# Patient Record
Sex: FEMALE | Race: BLACK OR AFRICAN AMERICAN | NOT HISPANIC OR LATINO | Employment: OTHER | URBAN - METROPOLITAN AREA
[De-identification: names, ages, dates, MRNs, and addresses within clinical notes are randomized per-mention and may not be internally consistent; named-entity substitution may affect disease eponyms.]

---

## 2017-01-10 RX ORDER — ALPRAZOLAM 0.25 MG/1
TABLET ORAL
Qty: 120 TABLET | Refills: 1 | Status: SHIPPED | OUTPATIENT
Start: 2017-01-10 | End: 2017-04-12 | Stop reason: SDUPTHER

## 2017-01-10 NOTE — TELEPHONE ENCOUNTER
I am refilling a requestedbenzodiazipine medication alprazolam for the patient. I would like to see her for DM fu and to discuss this medication-she has a1c scheduled jan 20 so after that 1-2 weeks  Please book the patient with me

## 2017-01-12 RX ORDER — CYCLOBENZAPRINE HCL 10 MG
10 TABLET ORAL 3 TIMES DAILY PRN
Qty: 90 TABLET | Refills: 5 | Status: SHIPPED | OUTPATIENT
Start: 2017-01-12 | End: 2017-10-17

## 2017-01-13 RX ORDER — BENAZEPRIL HYDROCHLORIDE AND HYDROCHLOROTHIAZIDE 10; 12.5 MG/1; MG/1
1 TABLET ORAL DAILY
Qty: 90 TABLET | Refills: 3 | Status: SHIPPED | OUTPATIENT
Start: 2017-01-13 | End: 2018-03-15 | Stop reason: SDUPTHER

## 2017-01-26 ENCOUNTER — LAB VISIT (OUTPATIENT)
Dept: LAB | Facility: HOSPITAL | Age: 72
End: 2017-01-26
Attending: FAMILY MEDICINE
Payer: COMMERCIAL

## 2017-01-26 DIAGNOSIS — E11.9 TYPE 2 DIABETES MELLITUS WITHOUT COMPLICATION: ICD-10-CM

## 2017-01-26 LAB
ALBUMIN SERPL BCP-MCNC: 3.2 G/DL
ALP SERPL-CCNC: 74 U/L
ALT SERPL W/O P-5'-P-CCNC: 9 U/L
ANION GAP SERPL CALC-SCNC: 6 MMOL/L
AST SERPL-CCNC: 16 U/L
BASOPHILS # BLD AUTO: 0.02 K/UL
BASOPHILS NFR BLD: 0.3 %
BILIRUB SERPL-MCNC: 0.6 MG/DL
BUN SERPL-MCNC: 19 MG/DL
CALCIUM SERPL-MCNC: 9.2 MG/DL
CHLORIDE SERPL-SCNC: 106 MMOL/L
CHOLEST/HDLC SERPL: 3.7 {RATIO}
CO2 SERPL-SCNC: 28 MMOL/L
CREAT SERPL-MCNC: 0.9 MG/DL
DIFFERENTIAL METHOD: ABNORMAL
EOSINOPHIL # BLD AUTO: 0.3 K/UL
EOSINOPHIL NFR BLD: 3.8 %
ERYTHROCYTE [DISTWIDTH] IN BLOOD BY AUTOMATED COUNT: 15.5 %
EST. GFR  (AFRICAN AMERICAN): >60 ML/MIN/1.73 M^2
EST. GFR  (NON AFRICAN AMERICAN): >60 ML/MIN/1.73 M^2
GLUCOSE SERPL-MCNC: 104 MG/DL
HCT VFR BLD AUTO: 36.2 %
HDL/CHOLESTEROL RATIO: 27.4 %
HDLC SERPL-MCNC: 179 MG/DL
HDLC SERPL-MCNC: 49 MG/DL
HGB BLD-MCNC: 11.4 G/DL
LDLC SERPL CALC-MCNC: 119.4 MG/DL
LYMPHOCYTES # BLD AUTO: 3.3 K/UL
LYMPHOCYTES NFR BLD: 42 %
MCH RBC QN AUTO: 26.5 PG
MCHC RBC AUTO-ENTMCNC: 31.5 %
MCV RBC AUTO: 84 FL
MONOCYTES # BLD AUTO: 0.7 K/UL
MONOCYTES NFR BLD: 8.2 %
NEUTROPHILS # BLD AUTO: 3.6 K/UL
NEUTROPHILS NFR BLD: 45.7 %
NONHDLC SERPL-MCNC: 130 MG/DL
PLATELET # BLD AUTO: 326 K/UL
PMV BLD AUTO: 9 FL
POTASSIUM SERPL-SCNC: 4.3 MMOL/L
PROT SERPL-MCNC: 6.7 G/DL
RBC # BLD AUTO: 4.3 M/UL
SODIUM SERPL-SCNC: 140 MMOL/L
TRIGL SERPL-MCNC: 53 MG/DL
TSH SERPL DL<=0.005 MIU/L-ACNC: 0.91 UIU/ML
WBC # BLD AUTO: 7.9 K/UL

## 2017-01-26 PROCEDURE — 36415 COLL VENOUS BLD VENIPUNCTURE: CPT | Mod: PO

## 2017-01-26 PROCEDURE — 84443 ASSAY THYROID STIM HORMONE: CPT

## 2017-01-26 PROCEDURE — 83036 HEMOGLOBIN GLYCOSYLATED A1C: CPT

## 2017-01-26 PROCEDURE — 85025 COMPLETE CBC W/AUTO DIFF WBC: CPT

## 2017-01-26 PROCEDURE — 80053 COMPREHEN METABOLIC PANEL: CPT

## 2017-01-26 PROCEDURE — 80061 LIPID PANEL: CPT

## 2017-01-27 LAB
ESTIMATED AVG GLUCOSE: 148 MG/DL
HBA1C MFR BLD HPLC: 6.8 %

## 2017-02-09 ENCOUNTER — TELEPHONE (OUTPATIENT)
Dept: FAMILY MEDICINE | Facility: CLINIC | Age: 72
End: 2017-02-09

## 2017-02-09 NOTE — TELEPHONE ENCOUNTER
----- Message from Christin Wong sent at 2/9/2017  1:10 PM CST -----  Contact: pt  Pt is requesting to speak with nurse regarding vaginal itching. Pls call pt back at [phone]

## 2017-02-14 ENCOUNTER — OFFICE VISIT (OUTPATIENT)
Dept: FAMILY MEDICINE | Facility: CLINIC | Age: 72
End: 2017-02-14
Payer: MEDICARE

## 2017-02-14 VITALS
WEIGHT: 187.38 LBS | HEIGHT: 65 IN | DIASTOLIC BLOOD PRESSURE: 83 MMHG | HEART RATE: 68 BPM | SYSTOLIC BLOOD PRESSURE: 152 MMHG | BODY MASS INDEX: 31.22 KG/M2 | TEMPERATURE: 98 F

## 2017-02-14 DIAGNOSIS — N89.8 VAGINAL ITCHING: Primary | ICD-10-CM

## 2017-02-14 DIAGNOSIS — N89.8 VAGINAL DISCHARGE: ICD-10-CM

## 2017-02-14 LAB
BACTERIA GENITAL QL WET PREP: ABNORMAL
CLUE CELLS VAG QL WET PREP: ABNORMAL
FILAMENT FUNGI VAG WET PREP-#/AREA: ABNORMAL
SPECIMEN SOURCE: ABNORMAL
T VAGINALIS GENITAL QL WET PREP: ABNORMAL
WBC #/AREA VAG WET PREP: ABNORMAL
YEAST GENITAL QL WET PREP: ABNORMAL

## 2017-02-14 PROCEDURE — 99213 OFFICE O/P EST LOW 20 MIN: CPT | Mod: PBBFAC,PO | Performed by: NURSE PRACTITIONER

## 2017-02-14 PROCEDURE — 99999 PR PBB SHADOW E&M-EST. PATIENT-LVL III: CPT | Mod: PBBFAC,,, | Performed by: NURSE PRACTITIONER

## 2017-02-14 PROCEDURE — 99213 OFFICE O/P EST LOW 20 MIN: CPT | Mod: S$PBB,,, | Performed by: NURSE PRACTITIONER

## 2017-02-14 PROCEDURE — 87210 SMEAR WET MOUNT SALINE/INK: CPT | Mod: PO

## 2017-02-14 RX ORDER — NYSTATIN 100000 U/G
CREAM TOPICAL 2 TIMES DAILY
Qty: 1 TUBE | Refills: 0 | Status: SHIPPED | OUTPATIENT
Start: 2017-02-14 | End: 2017-06-13

## 2017-02-14 RX ORDER — FLUCONAZOLE 150 MG/1
150 TABLET ORAL DAILY
Qty: 1 TABLET | Refills: 0 | Status: SHIPPED | OUTPATIENT
Start: 2017-02-14 | End: 2017-02-15

## 2017-02-14 NOTE — PROGRESS NOTES
Subjective:       Patient ID: Cheryl Quiroga is a 71 y.o. female.    Chief Complaint: Vaginal Itching    Vaginal Itching   The patient's primary symptoms include genital itching and vaginal discharge (and itching). The patient's pertinent negatives include no genital lesions, genital odor, genital rash, missed menses, pelvic pain or vaginal bleeding. This is a new problem. The current episode started 1 to 4 weeks ago. The problem occurs daily. The problem has been unchanged. The patient is experiencing no pain. The problem affects both sides. She is not pregnant. Pertinent negatives include no abdominal pain, anorexia, back pain, chills, constipation, diarrhea, discolored urine, dysuria, fever, flank pain, frequency, headaches, hematuria, joint pain, joint swelling, nausea, painful intercourse, rash, sore throat, urgency or vomiting. The vaginal discharge was thin and white. There has been no bleeding. She has not been passing clots. She has not been passing tissue. Nothing aggravates the symptoms. She has tried nothing for the symptoms. No, her partner does not have an STD. She uses nothing for contraception. She is postmenopausal. There is no history of an abdominal surgery, a  section, an ectopic pregnancy, endometriosis, a gynecological surgery, herpes simplex, menorrhagia, metrorrhagia, miscarriage, ovarian cysts, perineal abscess, PID, an STD, a terminated pregnancy or vaginosis.     Past Medical History   Diagnosis Date    Anemia     Anxiety     Asthma     Cataract     Colon polyps     Diverticulosis     Diverticulosis of large intestine without hemorrhage 2015    DM (diabetes mellitus) 2012    Encounter for blood transfusion     External hemorrhoids     GERD (gastroesophageal reflux disease) 2015    Hemorrhoids     Hypertension     Internal hemorrhoids     Left knee DJD 2015    Migraines      Social History     Social History    Marital status:       Spouse name: N/A    Number of children: N/A    Years of education: N/A     Occupational History         Social History Main Topics    Smoking status: Never Smoker    Smokeless tobacco: Never Used    Alcohol use No    Drug use: No    Sexual activity: Yes     Partners: Male     Past Surgical History   Procedure Laterality Date    Hysterectomy      Colonoscopy  2010    Joint replacement       right knee       Review of Systems   Constitutional: Negative.  Negative for chills and fever.   HENT: Negative.  Negative for sore throat.    Eyes: Negative.    Respiratory: Negative.    Cardiovascular: Negative.    Gastrointestinal: Negative.  Negative for abdominal pain, anorexia, constipation, diarrhea, nausea and vomiting.   Endocrine: Negative.    Genitourinary: Positive for vaginal discharge (and itching). Negative for dysuria, flank pain, frequency, hematuria, menorrhagia, missed menses, pelvic pain and urgency.   Musculoskeletal: Negative.  Negative for back pain and joint pain.   Skin: Negative.  Negative for rash.   Allergic/Immunologic: Negative.    Neurological: Negative.  Negative for headaches.   Psychiatric/Behavioral: Negative.        Objective:      Physical Exam   Constitutional: She is oriented to person, place, and time. She appears well-developed and well-nourished.   HENT:   Head: Normocephalic.   Right Ear: External ear normal.   Left Ear: External ear normal.   Nose: Nose normal.   Mouth/Throat: Oropharynx is clear and moist.   Eyes: Conjunctivae are normal. Pupils are equal, round, and reactive to light.   Neck: Normal range of motion. Neck supple.   Cardiovascular: Normal rate, regular rhythm and normal heart sounds.    Pulmonary/Chest: Effort normal and breath sounds normal.   Abdominal: Soft. Bowel sounds are normal.   Genitourinary: Pelvic exam was performed with patient supine. No labial fusion. There is no rash, tenderness, lesion or injury on the right labia. There is no rash, tenderness,  lesion or injury on the left labia. No erythema, tenderness or bleeding in the vagina. No foreign body in the vagina. No signs of injury around the vagina. Vaginal discharge (Small amount thin white) found.   Musculoskeletal: Normal range of motion.   Neurological: She is alert and oriented to person, place, and time.   Skin: Skin is warm and dry.   Psychiatric: She has a normal mood and affect. Her behavior is normal. Judgment and thought content normal.   Nursing note and vitals reviewed.      Assessment:       1. Vaginal itching    2. Vaginal discharge        Plan:           Cheryl was seen today for vaginal itching.    Diagnoses and all orders for this visit:    Vaginal itching  Vaginal discharge  -     Vaginal Screen Vagina; Future  -     nystatin (MYCOSTATIN) cream; Apply topically 2 (two) times daily.  -     fluconazole (DIFLUCAN) 150 MG Tab; Take 1 tablet (150 mg total) by mouth once daily.

## 2017-02-14 NOTE — MR AVS SNAPSHOT
[facility]  [address] [address]  Phone: [phone]  Fax: [phone]                  Cheryl Quiroga   2017 9:40 AM   Office Visit    Description:  Female : 1945   Provider:  Skylar Silveira NP   Department:  [facility]           Reason for Visit     Vaginal Itching           Diagnoses this Visit        Comments    Vaginal itching    -  Primary     Vaginal discharge                To Do List           Goals (5 Years of Data)     None      [facility]     [facility] Care [facility] -  Assistance  Registered nurses in the [facility] On Call Center provide clinical advisement, health education, appointment booking, and other advisory services.  Call for this free service at [phone].             Medications           Message regarding Medications     Verify the changes and/or additions to your medication regime listed below are the same as discussed with your clinician today.  If any of these changes or additions are incorrect, please notify your healthcare provider.             Verify that the below list of medications is an accurate representation of the medications you are currently taking.  If none reported, the list may be blank. If incorrect, please contact your healthcare provider. Carry this list with you in case of emergency.           Current Medications     alprazolam (XANAX) 0.25 MG tablet TAKE ONE TABLET FOUR TIMES DAILY    benazepril-hydrochlorthiazide (LOTENSIN HCT) 10-12.5 mg Tab Take 1 tablet by mouth once daily.    cephALEXin (KEFLEX) 500 MG capsule Take 1 capsule (500 mg total) by mouth every 12 (twelve) hours.    cyclobenzaprine (FLEXERIL) 10 MG tablet Take 1 tablet (10 mg total) by mouth 3 (three) times daily as needed.    donepezil (ARICEPT) 10 MG tablet Take 1 tablet (10 mg total) by mouth every evening.    fluticasone-salmeterol (ADVAIR DISKUS) 250-50 mcg/dose diskus inhaler Twice a day     "meloxicam (MOBIC) 15 MG tablet Take 1 tablet (15 mg total) by mouth once daily.    metformin (GLUCOPHAGE) 500 MG tablet TAKE TWO TABLETS BY MOUTH EVERY MORNING and 3t EVERY EVENING    MULTIVITAMIN ORAL Every day    tramadol (ULTRAM) 50 mg tablet TAKE ONE TABLET BY MOUTH THREE TIMES DAILY AS NEEDED    omeprazole (PRILOSEC) 40 MG capsule Take 1 capsule (40 mg total) by mouth once daily.           Clinical Reference Information           Your Vitals Were     BP Pulse Temp Height Weight BMI    152/83 68 97.8 °F (36.6 °C) (Oral) 5' 5" (1.651 m) 85 kg (187 lb 6.3 oz) 31.18 kg/m2      Blood Pressure          Most Recent Value    BP  (!)  152/83      Allergies as of 2/14/2017     Codeine    Iodine      Immunizations Administered on Date of Encounter - 2/14/2017     None      Orders Placed During Today's Visit     Future Labs/Procedures Expected by Expires    Vaginal Screen Vagina  2/14/2017 4/15/2018      Language Assistance Services     ATTENTION: Language assistance services are available, free of charge. Please call [phone].      ATENCIÓN: Si habla español, tiene a sheldon disposición servicios gratuitos de asistencia lingüística. Llame al [phone].     JOSEPH Ý: N?u b?n nói Ti?ng Vi?t, có các d?ch v? h? tr? ngôn ng? mi?n phí dành cho b?n. G?i s? [phone].         [facility] [place], operated by [facility] complies with applicable Federal civil rights laws and does not discriminate on the basis of race, color, national origin, age, disability, or sex.        "

## 2017-02-21 ENCOUNTER — OFFICE VISIT (OUTPATIENT)
Dept: FAMILY MEDICINE | Facility: CLINIC | Age: 72
End: 2017-02-21
Payer: MEDICARE

## 2017-02-21 VITALS
HEART RATE: 79 BPM | OXYGEN SATURATION: 98 % | DIASTOLIC BLOOD PRESSURE: 81 MMHG | SYSTOLIC BLOOD PRESSURE: 158 MMHG | HEIGHT: 65 IN | BODY MASS INDEX: 30.66 KG/M2 | WEIGHT: 184 LBS

## 2017-02-21 DIAGNOSIS — J06.9 VIRAL URI WITH COUGH: Primary | ICD-10-CM

## 2017-02-21 PROCEDURE — 99999 PR PBB SHADOW E&M-EST. PATIENT-LVL III: CPT | Mod: PBBFAC,,,

## 2017-02-21 PROCEDURE — 99213 OFFICE O/P EST LOW 20 MIN: CPT | Mod: PBBFAC,PO

## 2017-02-21 PROCEDURE — 99213 OFFICE O/P EST LOW 20 MIN: CPT | Mod: S$PBB,,,

## 2017-02-21 RX ORDER — PROMETHAZINE HYDROCHLORIDE AND DEXTROMETHORPHAN HYDROBROMIDE 6.25; 15 MG/5ML; MG/5ML
5 SYRUP ORAL 3 TIMES DAILY PRN
Qty: 180 ML | Refills: 0 | Status: SHIPPED | OUTPATIENT
Start: 2017-02-21 | End: 2017-03-03

## 2017-02-21 RX ORDER — METHYLPREDNISOLONE 4 MG/1
TABLET ORAL
Qty: 1 PACKAGE | Refills: 0 | Status: SHIPPED | OUTPATIENT
Start: 2017-02-21 | End: 2017-02-27

## 2017-02-21 NOTE — PROGRESS NOTES
Subjective:       Patient ID: Cheryl Quiroga is a 71 y.o. female.    Chief Complaint: No chief complaint on file.    HPI   The patient presents today with a 4 day(s) complaint of nasal congestion, PND, rhinorrhea. she says Her symptoms are constant and moderate in intensity.  she voices associated cough and  Sore throat. she denies fever, SOB, wheezing. she can not identify and exacerbating or mitigating factors.      Review of Systems   Constitutional: Negative for activity change, appetite change, fatigue, fever and unexpected weight change.   HENT: Positive for congestion, postnasal drip, rhinorrhea and sore throat.    Eyes: Negative.    Respiratory: Positive for cough. Negative for chest tightness, shortness of breath and wheezing.    Cardiovascular: Negative for chest pain, palpitations and leg swelling.   Gastrointestinal: Negative for constipation, diarrhea, nausea and vomiting.   Endocrine: Negative.    Genitourinary: Negative.    Musculoskeletal: Negative.    Skin: Negative for color change.   Allergic/Immunologic: Negative.    Neurological: Positive for headaches. Negative for dizziness, weakness and light-headedness.   Hematological: Negative.    Psychiatric/Behavioral: Negative for sleep disturbance.         Objective:      Physical Exam   Constitutional: She is oriented to person, place, and time. She appears well-developed and well-nourished. No distress.   HENT:   Head: Normocephalic and atraumatic. Hair is normal.   Right Ear: Hearing, tympanic membrane, external ear and ear canal normal.   Left Ear: Hearing, tympanic membrane, external ear and ear canal normal.   Nose: Mucosal edema and rhinorrhea present. No nose lacerations, sinus tenderness, nasal deformity, septal deviation or nasal septal hematoma. No epistaxis.  No foreign bodies. Right sinus exhibits no maxillary sinus tenderness and no frontal sinus tenderness. Left sinus exhibits no maxillary sinus tenderness and no frontal sinus  tenderness.   Mouth/Throat: Uvula is midline and mucous membranes are normal.   Eyes: Conjunctivae are normal. Pupils are equal, round, and reactive to light. Right eye exhibits no discharge. Left eye exhibits no discharge.   Neck: Trachea normal, normal range of motion and phonation normal. Neck supple. No tracheal deviation present.   Cardiovascular: Normal rate, regular rhythm, normal heart sounds and intact distal pulses.  Exam reveals no gallop and no friction rub.    No murmur heard.  Pulmonary/Chest: Effort normal and breath sounds normal. No respiratory distress. She has no decreased breath sounds. She has no wheezes. She has no rhonchi. She has no rales. She exhibits no tenderness.   Musculoskeletal: Normal range of motion.   Lymphadenopathy:        Head (right side): No submental, no submandibular, no tonsillar, no preauricular, no posterior auricular and no occipital adenopathy present.        Head (left side): No submental, no submandibular, no tonsillar, no preauricular, no posterior auricular and no occipital adenopathy present.     She has no cervical adenopathy.        Right cervical: No superficial cervical, no deep cervical and no posterior cervical adenopathy present.       Left cervical: No superficial cervical, no deep cervical and no posterior cervical adenopathy present.   Neurological: She is alert and oriented to person, place, and time. She exhibits normal muscle tone. GCS eye subscore is 4. GCS verbal subscore is 5. GCS motor subscore is 6.   Skin: Skin is warm and dry. No rash noted. She is not diaphoretic. No erythema. No pallor.   Psychiatric: She has a normal mood and affect. Her speech is normal and behavior is normal. Judgment and thought content normal.       Assessment:       1. Viral URI with cough          Plan:   Viral URI with cough  -     methylPREDNISolone (MEDROL DOSEPACK) 4 mg tablet; use as directed  Dispense: 1 Package; Refill: 0  -     promethazine-dextromethorphan  (PROMETHAZINE-DM) 6.25-15 mg/5 mL Syrp; Take 5 mLs by mouth 3 (three) times daily as needed.  Dispense: 180 mL; Refill: 0            Disclaimer: This note is prepared using voice recognition software.  As such there may be errors in the dictation.  It has not been proofread.

## 2017-02-21 NOTE — LETTER
February 21, 2017      [facility]  [address] [address]  Phone: [phone]  Fax: [phone]       Patient: Cheryl Quiorga   YOB: 1945  Date of Visit: 02/21/2017    To Whom It May Concern:    Cheryl was at [facility] on 02/21/2017. She may return to work/school on 02/24/2017 with no restrictions. If you have any questions or concerns, or if I can be of further assistance, please do not hesitate to contact me.    Sincerely,    SHIVANI Cox

## 2017-03-22 ENCOUNTER — OFFICE VISIT (OUTPATIENT)
Dept: ORTHOPEDICS | Facility: CLINIC | Age: 72
End: 2017-03-22
Payer: MEDICARE

## 2017-03-22 VITALS
HEART RATE: 84 BPM | BODY MASS INDEX: 30.46 KG/M2 | HEIGHT: 65 IN | DIASTOLIC BLOOD PRESSURE: 72 MMHG | SYSTOLIC BLOOD PRESSURE: 140 MMHG | WEIGHT: 182.81 LBS

## 2017-03-22 DIAGNOSIS — M54.32 SCIATICA, LEFT SIDE: Primary | ICD-10-CM

## 2017-03-22 DIAGNOSIS — M21.162 GENU VARUM OF LEFT LOWER EXTREMITY: ICD-10-CM

## 2017-03-22 DIAGNOSIS — G89.29 CHRONIC PAIN OF LEFT KNEE: ICD-10-CM

## 2017-03-22 DIAGNOSIS — M17.12 OSTEOARTHRITIS OF LEFT KNEE, UNSPECIFIED OSTEOARTHRITIS TYPE: ICD-10-CM

## 2017-03-22 DIAGNOSIS — M25.562 CHRONIC PAIN OF LEFT KNEE: ICD-10-CM

## 2017-03-22 PROCEDURE — 99214 OFFICE O/P EST MOD 30 MIN: CPT | Mod: S$PBB,,, | Performed by: ORTHOPAEDIC SURGERY

## 2017-03-22 PROCEDURE — 99999 PR PBB SHADOW E&M-EST. PATIENT-LVL III: CPT | Mod: PBBFAC,,, | Performed by: ORTHOPAEDIC SURGERY

## 2017-03-22 PROCEDURE — 99213 OFFICE O/P EST LOW 20 MIN: CPT | Mod: PBBFAC,PO | Performed by: ORTHOPAEDIC SURGERY

## 2017-03-22 RX ORDER — GABAPENTIN 300 MG/1
300 CAPSULE ORAL 2 TIMES DAILY
Qty: 60 CAPSULE | Refills: 0 | Status: SHIPPED | OUTPATIENT
Start: 2017-03-22 | End: 2017-10-17

## 2017-03-22 RX ORDER — METHYLPREDNISOLONE 4 MG/1
TABLET ORAL
Qty: 1 PACKAGE | Refills: 0 | Status: SHIPPED | OUTPATIENT
Start: 2017-03-22 | End: 2017-06-13

## 2017-03-22 RX ORDER — MELOXICAM 15 MG/1
15 TABLET ORAL DAILY
Qty: 30 TABLET | Refills: 3 | Status: SHIPPED | OUTPATIENT
Start: 2017-03-22 | End: 2017-08-10

## 2017-03-22 NOTE — MR AVS SNAPSHOT
[facility]  [address] [address]  Phone: [phone]                  Cheryl Quiroga   3/22/2017 9:15 AM   Office Visit    Description:  Female : 1945   Provider:  Jaden Quiroga MD   Department:  [facility]           Reason for Visit     Left Knee - Pain           Diagnoses this Visit        Comments    Sciatica, left side    -  Primary     Osteoarthritis of left knee, unspecified osteoarthritis type         Chronic pain of left knee         Genu varum of left lower extremity                To Do List           Future Appointments        Provider Department Dept Phone    2017 10:15 AM Jaden Quiroga MD [facility] [phone]      Goals (5 Years of Data)     None      Follow-Up and Disposition     Return in about 2 weeks (around 2017).       These Medications        Disp Refills Start End    methylPREDNISolone (MEDROL DOSEPACK) 4 mg tablet 1 Package 0 3/22/2017     use as directed    [facility] Ph #: [phone]       meloxicam (MOBIC) 15 MG tablet 30 tablet 3 3/22/2017     Take 1 tablet (15 mg total) by mouth once daily. - Oral    [facility] Ph #: [phone]       gabapentin (NEURONTIN) 300 MG capsule 60 capsule 0 3/22/2017 3/22/2018    Take 1 capsule (300 mg total) by mouth 2 (two) times daily. - Oral    [facility] Ph #: [phone]         [facility]     [facility] On Call Nurse Care Line -  Assistance  Registered nurses in the [facility] On Call Center provide clinical advisement, health education, appointment booking, and other advisory services.  Call for this free service at [phone].             Medications           Message regarding Medications     Verify the changes and/or additions to your medication regime listed below  are the same as discussed with your clinician today.  If any of these changes or additions are incorrect, please notify your healthcare provider.        START taking these NEW medications        Refills    methylPREDNISolone (MEDROL DOSEPACK) 4 mg tablet 0    Sig: use as directed    Class: Normal    gabapentin (NEURONTIN) 300 MG capsule 0    Sig: Take 1 capsule (300 mg total) by mouth 2 (two) times daily.    Class: Normal    Route: Oral           Verify that the below list of medications is an accurate representation of the medications you are currently taking.  If none reported, the list may be blank. If incorrect, please contact your healthcare provider. Carry this list with you in case of emergency.           Current Medications     alprazolam (XANAX) 0.25 MG tablet TAKE ONE TABLET FOUR TIMES DAILY    benazepril-hydrochlorthiazide (LOTENSIN HCT) 10-12.5 mg Tab Take 1 tablet by mouth once daily.    cephALEXin (KEFLEX) 500 MG capsule Take 1 capsule (500 mg total) by mouth every 12 (twelve) hours.    cyclobenzaprine (FLEXERIL) 10 MG tablet Take 1 tablet (10 mg total) by mouth 3 (three) times daily as needed.    donepezil (ARICEPT) 10 MG tablet Take 1 tablet (10 mg total) by mouth every evening.    fluticasone-salmeterol (ADVAIR DISKUS) 250-50 mcg/dose diskus inhaler Twice a day    meloxicam (MOBIC) 15 MG tablet Take 1 tablet (15 mg total) by mouth once daily.    metformin (GLUCOPHAGE) 500 MG tablet TAKE TWO TABLETS BY MOUTH EVERY MORNING and 3t EVERY EVENING    MULTIVITAMIN ORAL Every day    tramadol (ULTRAM) 50 mg tablet TAKE ONE TABLET BY MOUTH THREE TIMES DAILY AS NEEDED    gabapentin (NEURONTIN) 300 MG capsule Take 1 capsule (300 mg total) by mouth 2 (two) times daily.    methylPREDNISolone (MEDROL DOSEPACK) 4 mg tablet use as directed    nystatin (MYCOSTATIN) cream Apply topically 2 (two) times daily.    omeprazole (PRILOSEC) 40 MG capsule Take 1 capsule (40 mg total) by mouth once daily.           Clinical  "Reference Information           Your Vitals Were     BP Pulse Height Weight BMI    140/72 84 5' 5" (1.651 m) 82.9 kg (182 lb 12.8 oz) 30.42 kg/m2      Blood Pressure          Most Recent Value    BP  (!)  140/72      Allergies as of 3/22/2017     Codeine    Iodine      Immunizations Administered on Date of Encounter - 3/22/2017     None      Language Assistance Services     ATTENTION: Language assistance services are available, free of charge. Please call [phone].      ATENCIÓN: Si habla emre, tiene a sheldon disposición servicios gratuitos de asistencia lingüística. Llame al [phone].     JOSEPH Ý: N?u b?n nói Ti?ng Vi?t, có các d?ch v? h? tr? ngôn ng? mi?n phí dành cho b?n. G?i s? [phone].         [facility] complies with applicable Federal civil rights laws and does not discriminate on the basis of race, color, national origin, age, disability, or sex.        "

## 2017-03-22 NOTE — PROGRESS NOTES
Subjective:          Chief Complaint: Cheryl Quiroga is a 71 y.o. female who had concerns including Pain of the Left Knee.    Leg Pain    The incident occurred more than 1 week ago. There was no injury mechanism. The pain is present in the left leg, left thigh and left hip. The quality of the pain is described as aching, cramping and shooting. The pain is at a severity of 10/10. The pain is severe. The pain has been constant since onset. Associated symptoms include tingling. The symptoms are aggravated by movement and weight bearing. She has tried NSAIDs and rest for the symptoms. The treatment provided no relief.       Review of Systems   Musculoskeletal: Positive for muscle cramps and myalgias.   Neurological: Positive for tingling.   All other systems reviewed and are negative.                  Objective:        General: Cheryl is well-developed, well-nourished, appears stated age, in no acute distress, alert and oriented to time, place and person.     General    Vitals reviewed.  Constitutional: She appears well-developed and well-nourished. No distress.   HENT:   Head: Normocephalic and atraumatic.   Nose: Nose normal.   Eyes: Pupils are equal, round, and reactive to light.   Cardiovascular: Normal rate.    Pulmonary/Chest: Effort normal.   Neurological: She is alert.   Psychiatric: She has a normal mood and affect. Her behavior is normal. Judgment and thought content normal.     General Musculoskeletal Exam   Gait: antalgic       Right Knee Exam   Right knee exam is normal.    Left Knee Exam     Inspection   Scars: absent    Tenderness   Left knee tenderness location: no increased TTP on exam today.    Range of Motion   Extension: normal   Flexion: normal     Tests   Patella   Patellar Apprehension: negative  Patellar Glide (Quadrants): Lateral - 1 Medial - 2    Other   Sensation: normalBack (L-Spine & T-Spine) / Neck (C-Spine) Exam     Tenderness Left paramedian tenderness of the Sacrum and Lower L-Spine.      Back (L-Spine & T-Spine) Range of Motion   Flexion: abnormal   Lateral Bend Left: abnormal   Rotation Left: abnormal     Spinal Sensation   Left Side Sensation  L-Spine Level: dysesthesia    Back (L-Spine & T-Spine) Tests   Left Side Tests  Straight leg raise:       Supine SLR:  70 degrees      Muscle Strength   Left Lower Extremity   Hip Abduction: 5/5   Hip Flexion: 5/5   Hip Extensors: /5  Quadriceps:  5   Hamstrin/5   Anterior tibial:  /   Gastrocsoleus:  /  EHL:      Vascular Exam       Left Pulses  Dorsalis Pedis:      2+  Posterior Tibial:      2+        Edema  Left Lower Leg: absent              Assessment:       Encounter Diagnoses   Name Primary?    Osteoarthritis of left knee, unspecified osteoarthritis type     Chronic pain of left knee     Genu varum of left lower extremity     Sciatica, left side Yes          Plan:         Medrol Dosepack x 1 then return to Meloxicam  Gabapentin 300mg PO BID or QHS if it makes her sleepy  F/U in 2 weeks

## 2017-04-12 RX ORDER — ALPRAZOLAM 0.25 MG/1
TABLET ORAL
Qty: 120 TABLET | Refills: 1 | Status: SHIPPED | OUTPATIENT
Start: 2017-04-12 | End: 2018-10-25

## 2017-04-12 NOTE — TELEPHONE ENCOUNTER
I am refilling a requested medication x 1 for the patient and reviewed the chart.  The patient is due for a DM fu

## 2017-04-21 ENCOUNTER — TELEPHONE (OUTPATIENT)
Dept: FAMILY MEDICINE | Facility: CLINIC | Age: 72
End: 2017-04-21

## 2017-04-21 DIAGNOSIS — E11.9 TYPE 2 DIABETES MELLITUS WITHOUT COMPLICATION, WITHOUT LONG-TERM CURRENT USE OF INSULIN: Primary | ICD-10-CM

## 2017-05-02 ENCOUNTER — PATIENT OUTREACH (OUTPATIENT)
Dept: ADMINISTRATIVE | Facility: HOSPITAL | Age: 72
End: 2017-05-02

## 2017-05-02 NOTE — LETTER
May 2, 2017    Cheryl Quiroga  [address]  [place] [address]           [facility]  [address]  [facility] [address]  Phone: [phone] Dear Mrs. Quiroga:    [facility] is committed to your overall health.  To help you get the most out of each of your visits, we will review your information to make sure you are up to date on all of your recommended tests and/or procedures.      Augie Morris MD has found that you may be due for   Health Maintenance Due   Topic    Mammogram     Pneumococcal (65+) (2 of 2 - PPSV23)      If you have had any of the above done at another facility, please bring the records or information with you so that your record at [facility] will be complete.    If you are currently taking medication, please bring it with you to your appointment for review.    We will be happy to assist you with scheduling any necessary appointments or you may contact the [facility] appointment desk at [phone] to schedule at your convenience.     Thank you for choosing [facility] for your healthcare needs,      If you have any questions or concerns, please don't hesitate to call.    Sincerely,    Chela COTTON LPN  Care Coordination Department  [facility]

## 2017-05-09 ENCOUNTER — LAB VISIT (OUTPATIENT)
Dept: LAB | Facility: HOSPITAL | Age: 72
End: 2017-05-09
Attending: FAMILY MEDICINE
Payer: COMMERCIAL

## 2017-05-09 DIAGNOSIS — E11.9 TYPE 2 DIABETES MELLITUS WITHOUT COMPLICATION, WITHOUT LONG-TERM CURRENT USE OF INSULIN: ICD-10-CM

## 2017-05-09 PROCEDURE — 36415 COLL VENOUS BLD VENIPUNCTURE: CPT | Mod: PO

## 2017-05-09 PROCEDURE — 83036 HEMOGLOBIN GLYCOSYLATED A1C: CPT

## 2017-05-10 ENCOUNTER — OFFICE VISIT (OUTPATIENT)
Dept: ORTHOPEDICS | Facility: CLINIC | Age: 72
End: 2017-05-10
Payer: MEDICARE

## 2017-05-10 VITALS
BODY MASS INDEX: 30.76 KG/M2 | HEART RATE: 97 BPM | WEIGHT: 184.63 LBS | HEIGHT: 65 IN | SYSTOLIC BLOOD PRESSURE: 128 MMHG | DIASTOLIC BLOOD PRESSURE: 70 MMHG

## 2017-05-10 DIAGNOSIS — M25.562 CHRONIC PAIN OF LEFT KNEE: ICD-10-CM

## 2017-05-10 DIAGNOSIS — G89.29 CHRONIC PAIN OF LEFT KNEE: ICD-10-CM

## 2017-05-10 DIAGNOSIS — M17.12 OSTEOARTHRITIS OF LEFT KNEE, UNSPECIFIED OSTEOARTHRITIS TYPE: Primary | ICD-10-CM

## 2017-05-10 DIAGNOSIS — Z96.651 HISTORY OF TOTAL RIGHT KNEE REPLACEMENT: ICD-10-CM

## 2017-05-10 LAB
ESTIMATED AVG GLUCOSE: 157 MG/DL
HBA1C MFR BLD HPLC: 7.1 %

## 2017-05-10 PROCEDURE — 99213 OFFICE O/P EST LOW 20 MIN: CPT | Mod: S$PBB,25,, | Performed by: ORTHOPAEDIC SURGERY

## 2017-05-10 PROCEDURE — 20610 DRAIN/INJ JOINT/BURSA W/O US: CPT | Mod: PBBFAC,PO | Performed by: ORTHOPAEDIC SURGERY

## 2017-05-10 PROCEDURE — 99999 PR PBB SHADOW E&M-EST. PATIENT-LVL III: CPT | Mod: PBBFAC,,, | Performed by: ORTHOPAEDIC SURGERY

## 2017-05-10 PROCEDURE — 99213 OFFICE O/P EST LOW 20 MIN: CPT | Mod: PBBFAC,PO,25 | Performed by: ORTHOPAEDIC SURGERY

## 2017-05-10 RX ORDER — TRIAMCINOLONE ACETONIDE 40 MG/ML
80 INJECTION, SUSPENSION INTRA-ARTICULAR; INTRAMUSCULAR
Status: DISCONTINUED | OUTPATIENT
Start: 2017-05-10 | End: 2017-05-10 | Stop reason: HOSPADM

## 2017-05-10 RX ADMIN — TRIAMCINOLONE ACETONIDE 80 MG: 40 INJECTION, SUSPENSION INTRA-ARTICULAR; INTRAMUSCULAR at 10:05

## 2017-05-10 NOTE — PROGRESS NOTES
CC:left KNEE pain    HISTORY OF PRESENT ILLNESS:  Cheryl Quiroga is a 71 y.o.  Female who reports left knee pain refractory to conservative management. She continues to report pain with weight bearing activities and at rest now    History isnegative fortrauma but with pain for several years.    Today the patient rates pain at a 8/10 on visual analog scale.      The patient has tried previous viscosupplementation and injections and Mobic to make the pain better with marked relief of the pain in the past.    She reports that the pain is worse with standing; walking; stairs.  It does bother the patient at night.    negative for mechanical symptoms, negative forinstability; + swelling    It does affect the performance of ADLs. It does currently affect the ability to perform exercises or recreational activities which include: walking.    Occupation: retired    History of Right TKA (~2001 by Dr. Welch at [facility]) That knee is doing well, although she did not regain all of her flexion      Past Medical History:   Diagnosis Date    Anemia     Anxiety     Asthma     Cataract     Colon polyps     Diverticulosis     Diverticulosis of large intestine without hemorrhage 8/17/2015    DM (diabetes mellitus) 4/25/2012    Encounter for blood transfusion     External hemorrhoids     GERD (gastroesophageal reflux disease) 8/16/2015    Hemorrhoids     Hypertension     Internal hemorrhoids     Left knee DJD 2/11/2015    Migraines     Sciatica, left side 3/22/2017       Past Surgical History:   Procedure Laterality Date    COLONOSCOPY  2010    HYSTERECTOMY      JOINT REPLACEMENT      right knee       Family History   Problem Relation Age of Onset    Heart disease Mother     Hypertension Father     Diabetes Father     Colon polyps Father     Colon cancer Neg Hx     Stomach cancer Neg Hx          Current Outpatient Prescriptions:     alprazolam (XANAX) 0.25 MG tablet, TAKE ONE TABLET FOUR TIMES DAILY,  Disp: 120 tablet, Rfl: 1    benazepril-hydrochlorthiazide (LOTENSIN HCT) 10-12.5 mg Tab, Take 1 tablet by mouth once daily., Disp: 90 tablet, Rfl: 3    cephALEXin (KEFLEX) 500 MG capsule, Take 1 capsule (500 mg total) by mouth every 12 (twelve) hours., Disp: 14 capsule, Rfl: 0    cyclobenzaprine (FLEXERIL) 10 MG tablet, Take 1 tablet (10 mg total) by mouth 3 (three) times daily as needed., Disp: 90 tablet, Rfl: 5    donepezil (ARICEPT) 10 MG tablet, Take 1 tablet (10 mg total) by mouth every evening., Disp: 30 tablet, Rfl: 11    fluticasone-salmeterol (ADVAIR DISKUS) 250-50 mcg/dose diskus inhaler, Twice a day, Disp: , Rfl:     gabapentin (NEURONTIN) 300 MG capsule, Take 1 capsule (300 mg total) by mouth 2 (two) times daily., Disp: 60 capsule, Rfl: 0    meloxicam (MOBIC) 15 MG tablet, Take 1 tablet (15 mg total) by mouth once daily., Disp: 30 tablet, Rfl: 3    metformin (GLUCOPHAGE) 500 MG tablet, TAKE TWO TABLETS BY MOUTH EVERY MORNING and 3t EVERY EVENING, Disp: 150 tablet, Rfl: 11    methylPREDNISolone (MEDROL DOSEPACK) 4 mg tablet, use as directed, Disp: 1 Package, Rfl: 0    MULTIVITAMIN ORAL, Every day, Disp: , Rfl:     tramadol (ULTRAM) 50 mg tablet, TAKE ONE TABLET BY MOUTH THREE TIMES DAILY AS NEEDED, Disp: 90 tablet, Rfl: 1    nystatin (MYCOSTATIN) cream, Apply topically 2 (two) times daily., Disp: 1 Tube, Rfl: 0    omeprazole (PRILOSEC) 40 MG capsule, Take 1 capsule (40 mg total) by mouth once daily., Disp: 30 capsule, Rfl: 11    Review of patient's allergies indicates:   Allergen Reactions    Codeine      Other reaction(s): Unknown    Iodine      Other reaction(s): Unknown       Review of Systems   Constitutional:        Difficulty sleeping   HENT: Positive for congestion.         Taste change   Eyes:        Change in vision   Respiratory:        Allergies; asthma   Cardiovascular:        HTN   Gastrointestinal: Positive for constipation.        Hemorrhoids; difficulty swallowing    Genitourinary: Positive for frequency.        Incontinence   Musculoskeletal: Positive for joint pain and myalgias.   Skin: Positive for rash.   Neurological: Positive for headaches.   Endo/Heme/Allergies:        Anemia; diabetes   All other systems reviewed and are negative.      OBJECTIVE:  Vitals:    05/10/17 0958   BP: 128/70   Pulse: 97       PHYSICAL EXAMINATION    General:  The patient is alert and oriented x 3.  Mood is pleasant.  Observation of ears, eyes and nose reveal no gross abnormalities.  No labored breathing observed.    Left KNEE EXAMINATION     OBSERVATION / INSPECTION   Gait:   Mildly antalgic   Alignment:  Left varus  Scars:   None   Muscle atrophy: Mild  Effusion:  Mild   Warmth:  None   Discoloration:   none     TENDERNESS / CREPITUS (T / C):          T / C      T / C   Patella   ++/ -   Lateral joint line   - / -    Peripatellar medial  +  Medial joint line    +++ / -    Peripatellar lateral -  Medial plica   - / -    Patellar tendon -   Popliteal fossa   - / -    Quad tendon   -   Gastrocnemius   -   Prepatellar Bursa - / -   Quadricep   -   Tibial tubercle  -  Thigh/hamstring  -   Pes anserine/HS -  Fibula    -   ITB   - / -  Tibia     -   Tib/fib joint  - / -  LCL    -     MFC   - / -   MCL: Proximal  -    LFC   - / -    Distal   -          ROM: (* = pain)  PASSIVE   ACTIVE    Left :   0 / 120*    0 / 120*     Right :     0 / 110    0 / 110    Patellofemoral examination:  See above noted areas of tenderness.   Patella position    Subluxation / dislocation: Centered           Sup. / Inf;   Normal   Crepitus (PF):    ++  Patellar Mobility:       Medial-lateral:   Normal    Superior-inferior:  Normal    Inferior tilt   Normal    Patellar tendon:  Normal   Lateral tilt:    Normal   J-sign:     None   Patellofemoral grind:   ++ pain       MENISCAL SIGNS:     Pain on terminal extension:  -  Pain on terminal flexion:  +  Lindseys maneuver:  + for pain medially on left      LIGAMENT  EXAMINATION:  ACL / Lachman:  normal (-1 to 2mm)    PCL-Post.  drawer: normal 0 to 2mm  MCL- Valgus:  normal 0 to 2mm with pain  LCL- Varus:    normal 0 to 2mm    STRENGTH: (* = with pain) PAINFUL SIDE   Quadricep   5/5   Hamstrin/5    EXTREMITY NEURO-VASCULAR EXAMINATION:   Sensation:  Grossly intact to light touch all dermatomal regions.   Motor Function:  Fully intact motor function at hip, knee, foot and ankle      Vascular status:  DP and PT pulses 2+, brisk capillary refill, symmetric.        Xrays: reviewed personally by me (standing AP/flexion, lateral, sunrise) show:   Findings: AP and PA flexion standing views of both knees as well as lateral and Merchant views of the right and a Merchant view of the left knee were obtained. There are postoperative changes of right total knee replacement. Prosthesis position and   alignment are satisfactory with no radiographic evidence for loosening or other complicating process. Tricompartmental degenerative changes present in the contralateral left knee. No acute fracture or dislocation.       ASSESSMENT:    Left Knee osteoarthritis with exacerbation of symptoms  Left knee varus deformity secondary to medial compartment end stage arthritis  S/p right TKA: doing well    PLAN:   Left knee injection  Meloxicam 15mg PO QD  The patient was counseled today regarding her diagnostic study results and the different treatment options. I spent >50% of the time discussing conservative vs. Operative options with the patient as well as risks and benefits of the different options.  All questions were answered, pt will contact us for questions or concerns in the interim.

## 2017-05-10 NOTE — PROCEDURES
Large Joint Aspiration/Injection  Date/Time: 5/10/2017 10:30 AM  Performed by: ELLIE COTTO  Authorized by: ELLIE COTTO     Consent Done?:  Yes (Verbal)  Indications:  Pain and joint swelling  Procedure site marked: Yes    Timeout: Prior to procedure the correct patient, procedure, and site was verified      Location:  Knee  Site:  L knee  Prep: Patient was prepped and draped in usual sterile fashion    Ultrasonic Guidance for needle placement: No  Needle size:  22 G  Approach:  Anterolateral  Medications:  80 mg triamcinolone acetonide 40 mg/mL  Patient tolerance:  Patient tolerated the procedure well with no immediate complications

## 2017-05-18 ENCOUNTER — TELEPHONE (OUTPATIENT)
Dept: FAMILY MEDICINE | Facility: CLINIC | Age: 72
End: 2017-05-18

## 2017-06-13 ENCOUNTER — OFFICE VISIT (OUTPATIENT)
Dept: FAMILY MEDICINE | Facility: CLINIC | Age: 72
End: 2017-06-13
Payer: MEDICARE

## 2017-06-13 ENCOUNTER — HOSPITAL ENCOUNTER (OUTPATIENT)
Dept: RADIOLOGY | Facility: HOSPITAL | Age: 72
Discharge: HOME | End: 2017-06-13
Attending: FAMILY MEDICINE
Payer: MEDICARE

## 2017-06-13 VITALS — BODY MASS INDEX: 30.82 KG/M2 | HEIGHT: 65 IN | WEIGHT: 185 LBS

## 2017-06-13 VITALS
WEIGHT: 185.19 LBS | DIASTOLIC BLOOD PRESSURE: 70 MMHG | SYSTOLIC BLOOD PRESSURE: 117 MMHG | HEART RATE: 79 BPM | BODY MASS INDEX: 30.85 KG/M2 | HEIGHT: 65 IN

## 2017-06-13 DIAGNOSIS — M17.12 PRIMARY OSTEOARTHRITIS OF LEFT KNEE: ICD-10-CM

## 2017-06-13 DIAGNOSIS — Z12.39 BREAST CANCER SCREENING: ICD-10-CM

## 2017-06-13 DIAGNOSIS — Z12.31 ENCOUNTER FOR SCREENING MAMMOGRAM FOR MALIGNANT NEOPLASM OF BREAST: ICD-10-CM

## 2017-06-13 DIAGNOSIS — J45.30 MILD PERSISTENT ASTHMA WITHOUT COMPLICATION: ICD-10-CM

## 2017-06-13 DIAGNOSIS — E11.9 TYPE 2 DIABETES MELLITUS WITHOUT COMPLICATION, WITHOUT LONG-TERM CURRENT USE OF INSULIN: Primary | ICD-10-CM

## 2017-06-13 DIAGNOSIS — I10 ESSENTIAL HYPERTENSION: ICD-10-CM

## 2017-06-13 PROCEDURE — 77063 BREAST TOMOSYNTHESIS BI: CPT | Mod: 26,,, | Performed by: RADIOLOGY

## 2017-06-13 PROCEDURE — 77067 SCR MAMMO BI INCL CAD: CPT | Mod: 26,,, | Performed by: RADIOLOGY

## 2017-06-13 PROCEDURE — G0009 ADMIN PNEUMOCOCCAL VACCINE: HCPCS | Mod: S$GLB,,, | Performed by: FAMILY MEDICINE

## 2017-06-13 PROCEDURE — 90732 PPSV23 VACC 2 YRS+ SUBQ/IM: CPT | Mod: S$GLB,,, | Performed by: FAMILY MEDICINE

## 2017-06-13 PROCEDURE — 4010F ACE/ARB THERAPY RXD/TAKEN: CPT | Mod: ,,, | Performed by: FAMILY MEDICINE

## 2017-06-13 PROCEDURE — 3045F PR MOST RECENT HEMOGLOBIN A1C LEVEL 7.0-9.0%: CPT | Mod: ,,, | Performed by: FAMILY MEDICINE

## 2017-06-13 PROCEDURE — 99214 OFFICE O/P EST MOD 30 MIN: CPT | Mod: S$PBB,25,, | Performed by: FAMILY MEDICINE

## 2017-06-13 PROCEDURE — 1159F MED LIST DOCD IN RCRD: CPT | Mod: ,,, | Performed by: FAMILY MEDICINE

## 2017-06-13 PROCEDURE — 99212 OFFICE O/P EST SF 10 MIN: CPT | Mod: PBBFAC,PO | Performed by: FAMILY MEDICINE

## 2017-06-13 PROCEDURE — 77067 SCR MAMMO BI INCL CAD: CPT | Mod: TC

## 2017-06-13 PROCEDURE — 99999 PR PBB SHADOW E&M-EST. PATIENT-LVL II: CPT | Mod: PBBFAC,,, | Performed by: FAMILY MEDICINE

## 2017-06-13 NOTE — PROGRESS NOTES
The patient presents [place] DM-gen well controlled last a1c 7.1, BP fu is well controlled on current meds with a recent history of knee pain Dr Quiroga anticipating TKR left Asthma -had few flares this winter but resp well to intermittent albuterol MRI.   Past Medical History:  Past Medical History:   Diagnosis Date    Anemia     Anxiety     Asthma     Cataract     Colon polyps     Diverticulosis     Diverticulosis of large intestine without hemorrhage 8/17/2015    DM (diabetes mellitus) 4/25/2012    Encounter for blood transfusion     External hemorrhoids     GERD (gastroesophageal reflux disease) 8/16/2015    Hemorrhoids     Hypertension     Internal hemorrhoids     Left knee DJD 2/11/2015    Migraines     Sciatica, left side 3/22/2017     Past Surgical History:   Procedure Laterality Date    COLONOSCOPY  2010    HYSTERECTOMY      JOINT REPLACEMENT      right knee     Allergies   Allergen Reactions    Codeine      Other reaction(s): Unknown    Iodine      Other reaction(s): Unknown     Current Outpatient Prescriptions on File Prior to Visit   Medication Sig Dispense Refill    alprazolam (XANAX) 0.25 MG tablet TAKE ONE TABLET FOUR TIMES DAILY 120 tablet 1    benazepril-hydrochlorthiazide (LOTENSIN HCT) 10-12.5 mg Tab Take 1 tablet by mouth once daily. 90 tablet 3    cephALEXin (KEFLEX) 500 MG capsule Take 1 capsule (500 mg total) by mouth every 12 (twelve) hours. 14 capsule 0    cyclobenzaprine (FLEXERIL) 10 MG tablet Take 1 tablet (10 mg total) by mouth 3 (three) times daily as needed. 90 tablet 5    donepezil (ARICEPT) 10 MG tablet Take 1 tablet (10 mg total) by mouth every evening. 30 tablet 11    fluticasone-salmeterol (ADVAIR DISKUS) 250-50 mcg/dose diskus inhaler Twice a day      gabapentin (NEURONTIN) 300 MG capsule Take 1 capsule (300 mg total) by mouth 2 (two) times daily. 60 capsule 0    meloxicam (MOBIC) 15 MG tablet Take 1 tablet (15 mg total) by mouth once daily. 30  tablet 3    metformin (GLUCOPHAGE) 500 MG tablet TAKE TWO TABLETS BY MOUTH EVERY MORNING and 3t EVERY EVENING 150 tablet 11    MULTIVITAMIN ORAL Every day      omeprazole (PRILOSEC) 40 MG capsule Take 1 capsule (40 mg total) by mouth once daily. 30 capsule 11    tramadol (ULTRAM) 50 mg tablet TAKE ONE TABLET BY MOUTH THREE TIMES DAILY AS NEEDED 90 tablet 1    [DISCONTINUED] methylPREDNISolone (MEDROL DOSEPACK) 4 mg tablet use as directed 1 Package 0    [DISCONTINUED] nystatin (MYCOSTATIN) cream Apply topically 2 (two) times daily. 1 Tube 0     No current facility-administered medications on file prior to visit.      Social History     Social History    Marital status:      Spouse name: N/A    Number of children: N/A    Years of education: N/A     Occupational History    Not on file.     Social History Main Topics    Smoking status: Never Smoker    Smokeless tobacco: Never Used    Alcohol use No    Drug use: No    Sexual activity: Yes     Partners: Male     Other Topics Concern    Not on file     Social History Narrative    No narrative on file     Family History   Problem Relation Age of Onset    Heart disease Mother     Hypertension Father     Diabetes Father     Colon polyps Father     Colon cancer Neg Hx     Stomach cancer Neg Hx        ROS:  SKIN: No rashes, itching or changes in color or texture of skin.  EYES: Visual acuity fine. No photophobia, ocular pain or diplopia.EARS: Denies ear pain, discharge or vertigo.NOSE: No loss of smell, no epistaxis or postnasal drip.MOUTH & THROAT: No hoarseness or change in voice. No excessive gum bleeding.NODES: Denies swollen glands.  CHEST: Denies GARCIA, cyanosis, wheezing, cough and sputum production.  CARDIOVASCULAR: Denies chest pain, PND, orthopnea or reduced exercise tolerance.  ABDOMEN:  No weight loss.Mild abdominal pain, no hematemesis or blood in stool.  URINARY: No flank pain, dysuria or hematuria.  PERIPHERAL VASCULAR: No claudication  or cyanosis.  MUSCULOSKELETAL: Negative   NEUROLOGIC: No history of seizures, paralysis, alteration of gait or coordination.    PE Vital signs noted  Heent: Normocephalic,with no recent trauma,PERRLA,EOMI,conjunctiva clear with no exudate.Nasopharynx is not injected .Otic canal.TMs are not affected.Pharynx is slightly red.   Neck:Supple without adenopathy  Chest:Clear bilateral breath sounds normal  Heart:Regular rhthym without murmer  Abdomen:Soft, mild generalized tenderness,no rebound,no masses, no hepatosplenomegaly  Extremeties Gen degenerative changes  Foot exam: 14 pt bilateral sensory vasc exam nl, small callous rt 2nd mtp  Neurologic:Grossly within normal limits  Impression:  DM    DJD, mod severe lt knee arthralgia  HBP  DM  Asthma  Plan: DM mod controlled needs to continue testing tid , rx supplies   Lab eval-Needs urine microalb and CMP Lipids   Rev risk nsaids and follow renal fx   Clear for surgery

## 2017-07-05 ENCOUNTER — OFFICE VISIT (OUTPATIENT)
Dept: NEUROLOGY | Facility: CLINIC | Age: 72
End: 2017-07-05
Payer: COMMERCIAL

## 2017-07-05 VITALS
HEART RATE: 60 BPM | SYSTOLIC BLOOD PRESSURE: 142 MMHG | DIASTOLIC BLOOD PRESSURE: 80 MMHG | WEIGHT: 186.75 LBS | BODY MASS INDEX: 31.11 KG/M2 | HEIGHT: 65 IN

## 2017-07-05 DIAGNOSIS — G31.84 MILD COGNITIVE IMPAIRMENT WITH MEMORY LOSS: Primary | ICD-10-CM

## 2017-07-05 PROCEDURE — 99999 PR PBB SHADOW E&M-EST. PATIENT-LVL III: CPT | Mod: PBBFAC,,, | Performed by: PSYCHIATRY & NEUROLOGY

## 2017-07-05 PROCEDURE — 99213 OFFICE O/P EST LOW 20 MIN: CPT | Mod: PBBFAC | Performed by: PSYCHIATRY & NEUROLOGY

## 2017-07-05 PROCEDURE — 99214 OFFICE O/P EST MOD 30 MIN: CPT | Mod: S$PBB,,, | Performed by: PSYCHIATRY & NEUROLOGY

## 2017-07-05 PROCEDURE — 1159F MED LIST DOCD IN RCRD: CPT | Mod: ,,, | Performed by: PSYCHIATRY & NEUROLOGY

## 2017-07-05 PROCEDURE — 1125F AMNT PAIN NOTED PAIN PRSNT: CPT | Mod: ,,, | Performed by: PSYCHIATRY & NEUROLOGY

## 2017-07-05 NOTE — PROGRESS NOTES
This 72-year-old right-handed patient has a prior history of mild cognitive impairment manifested by recent memory difficulty.  Previous MRI has shown evidence of small vessel vascular disease.  Her prior thyroid and B12 levels are all been good.  The patient is currently on Aricept 10 mg a day.    The patient reports that she is doing well.  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  The patient states that she tries to stay busy and is active in her home environment.  The patient is independent for all activities of daily living.    The patient states that her memory seems to be about the same.  There has not been any deterioration that she can tell.  She is able to perform work activities without difficulty.  She is driving without difficulty.  The patient states that she has not had any headache, dizziness nausea, or vomiting.  She is not aware of any weakness, awkwardness, or clumsiness of the upper or lower extremities.  She denies any change in walking or standing balance.  She denies any intercurrent illnesses since last being seen but is scheduled for a total knee arthroplasty in the near future.      ROS:  GENERAL: No fever, chills, fatigability or weight loss.  SKIN: No rashes, itching or changes in color or texture of skin.  HEAD: No headaches or recent head trauma.  EYES: Visual acuity fine. No photophobia, ocular pain or diplopia.  EARS: Denies ear pain, discharge or vertigo.  NOSE: No loss of smell, no epistaxis or postnasal drip.  MOUTH & THROAT: No hoarseness or change in voice. No excessive gum bleeding.  NODES: Denies swollen glands.  CHEST: Denies GARCIA, cyanosis, wheezing, cough and sputum production.  CARDIOVASCULAR: Denies chest pain, PND, orthopnea or reduced exercise tolerance.  ABDOMEN: Appetite fine. No weight loss. Denies  diarrhea, abdominal pain, hematemesis or blood in stool.  URINARY: No flank pain, dysuria or hematuria.  PERIPHERAL VASCULAR: No claudication or cyanosis.  MUSCULOSKELETAL: The patient has chronic left knee pain.  Denies back pain.  NEUROLOGIC: No history of seizures, paralysis, alteration of gait or coordination.    The patient's past history, family history, and social history have been reviewed with the patient and her medical records.  No changes are being made.    PE:   VITAL SIGNS: Blood pressure 142/80, pulse 76, weight 84.7 kg, height 5 foot 5 inches, BMI 31.07  APPEARANCE: Well nourished, well developed, in no acute distress.    HEAD: Normocephalic, atraumatic.  EYES: PERRL. EOMI.  Non-icteric sclerae.    EARS: TM's intact. Light reflex normal. No retraction or perforation.    NOSE: Mucosa pink. Airway clear.  MOUTH & THROAT: No tonsillar enlargement. No pharyngeal erythema or exudate. No stridor.  NECK: Supple. No bruits.  CHEST: Lungs clear to auscultation.  CARDIOVASCULAR: Regular rhythm without significant murmurs.  ABDOMEN: Bowel sounds normal. Not distended. Soft. No tenderness or masses.  MUSCULOSKELETAL:  No bony deformity seen.  Muscle tone is normal.  Muscle mass is normal.  NEUROLOGIC:   Mental Status:  The patient is well oriented to person, time, place, and situation.  The patient is able to recall 3 unrelated words at 3 minutes.  She was able to identify pictures without difficulty.  She is able to repeat 5 digits forwards and 3 digits backwards.  The patient is attentive to the environment and cooperative for the exam.  Cranial Nerves: II-XII grossly intact. Fundoscopic exam is normal.  No hemorrhage, exudate or papilledema is present. The extraocular muscles are intact in the cardinal directions of gaze.  No ptosis is present. Facial features are symmetrical.  Speech is normal in fluency, diction, and phrasing.  Tongue protrudes in the midline.    Gait and Station:  Romberg is negative.   Good alternate armswing with normal gait.  Motor:  No downdrift of either arm when held at shoulder level.  Manual muscle testing of proximal and distal muscles of both upper and lower extremities is normal. Muscle mass is normal.  Muscle tone is normal.  Sensory:  Intact both upper and lower extremities to pin prick, touch, and vibration.  Cerebellar:  Finger to nose done well.  Alternating movements intact.  No involuntary movements or tremor seen.  Reflexes:  Stretch reflexes are 2+ both upper and lower extremities.  Plantar stimulation is flexor bilaterally and no pathological reflexes are seen        ASSESSMENT:  1.  Stable neurologic examination in patient with very mild memory impairment without evidence of progression or evidence of dementia    RECOMMENDATIONS:  1.  The patient may continue Aricept as previously prescribed and would return for routine visit in one year or sooner if symptoms progress    This was a 30 minute visit with the patient with over 50% of time spent counseling the patient regarding the significance of recent memory impairment as the manifestation of mild cognitive impairment.  Medications were also discussed.  This note is generated with speech recognition software and is subject to transcription error and sound alike phrases that may be missed by proofreading.

## 2017-07-07 DIAGNOSIS — M25.562 CHRONIC PAIN OF LEFT KNEE: ICD-10-CM

## 2017-07-07 DIAGNOSIS — M21.162 GENU VARUM OF LEFT LOWER EXTREMITY: ICD-10-CM

## 2017-07-07 DIAGNOSIS — G89.29 CHRONIC PAIN OF LEFT KNEE: ICD-10-CM

## 2017-07-07 DIAGNOSIS — M17.12 PRIMARY OSTEOARTHRITIS OF LEFT KNEE: Primary | ICD-10-CM

## 2017-07-07 RX ORDER — SODIUM CHLORIDE 9 MG/ML
INJECTION, SOLUTION INTRAVENOUS CONTINUOUS
Status: CANCELLED | OUTPATIENT
Start: 2017-07-07

## 2017-07-24 ENCOUNTER — OFFICE VISIT (OUTPATIENT)
Dept: ORTHOPEDICS | Facility: CLINIC | Age: 72
End: 2017-07-24
Payer: MEDICARE

## 2017-07-24 ENCOUNTER — CLINICAL SUPPORT (OUTPATIENT)
Dept: ORTHOPEDICS | Facility: CLINIC | Age: 72
End: 2017-07-24
Payer: COMMERCIAL

## 2017-07-24 VITALS
WEIGHT: 184.38 LBS | DIASTOLIC BLOOD PRESSURE: 86 MMHG | HEART RATE: 76 BPM | SYSTOLIC BLOOD PRESSURE: 145 MMHG | HEIGHT: 65 IN | BODY MASS INDEX: 30.72 KG/M2

## 2017-07-24 DIAGNOSIS — Z96.652 S/P TKR (TOTAL KNEE REPLACEMENT) USING CEMENT, LEFT: ICD-10-CM

## 2017-07-24 DIAGNOSIS — M17.12 PRIMARY OSTEOARTHRITIS OF LEFT KNEE: Primary | ICD-10-CM

## 2017-07-24 DIAGNOSIS — G89.18 POST-OP PAIN: ICD-10-CM

## 2017-07-24 DIAGNOSIS — G89.29 CHRONIC PAIN OF LEFT KNEE: ICD-10-CM

## 2017-07-24 DIAGNOSIS — M25.562 CHRONIC PAIN OF LEFT KNEE: ICD-10-CM

## 2017-07-24 DIAGNOSIS — M21.162 GENU VARUM OF LEFT LOWER EXTREMITY: ICD-10-CM

## 2017-07-24 PROCEDURE — 1125F AMNT PAIN NOTED PAIN PRSNT: CPT | Mod: ,,, | Performed by: ORTHOPAEDIC SURGERY

## 2017-07-24 PROCEDURE — 99213 OFFICE O/P EST LOW 20 MIN: CPT | Mod: PBBFAC,PO | Performed by: ORTHOPAEDIC SURGERY

## 2017-07-24 PROCEDURE — 99999 PR PBB SHADOW E&M-EST. PATIENT-LVL III: CPT | Mod: PBBFAC,,, | Performed by: ORTHOPAEDIC SURGERY

## 2017-07-24 PROCEDURE — 99214 OFFICE O/P EST MOD 30 MIN: CPT | Mod: 57,S$PBB,, | Performed by: ORTHOPAEDIC SURGERY

## 2017-07-24 PROCEDURE — 1159F MED LIST DOCD IN RCRD: CPT | Mod: ,,, | Performed by: ORTHOPAEDIC SURGERY

## 2017-07-24 RX ORDER — HYDROMORPHONE HYDROCHLORIDE 2 MG/1
2 TABLET ORAL
Qty: 90 TABLET | Refills: 0 | Status: SHIPPED | OUTPATIENT
Start: 2017-07-24 | End: 2017-08-30 | Stop reason: SDUPTHER

## 2017-07-24 RX ORDER — DOCUSATE SODIUM 100 MG/1
100 CAPSULE, LIQUID FILLED ORAL 2 TIMES DAILY
Refills: 0 | COMMUNITY
Start: 2017-07-24 | End: 2017-10-17

## 2017-07-24 RX ORDER — ASPIRIN 325 MG
325 TABLET, DELAYED RELEASE (ENTERIC COATED) ORAL 2 TIMES DAILY
Qty: 60 TABLET | Refills: 0 | COMMUNITY
Start: 2017-07-24 | End: 2017-08-10

## 2017-07-24 NOTE — PROGRESS NOTES
CC:left KNEE pain    HISTORY OF PRESENT ILLNESS:  Cheryl Quiroga is a 72 y.o.  Female who reports left knee pain refractory to conservative management. She continues to report pain with weight bearing activities and at rest now. She would like to go forth with left TKA    History isnegative fortrauma but with pain for several years.    Today the patient rates pain at a 8/10 on visual analog scale.      The patient has tried previous viscosupplementation and injections and Mobic to make the pain better with marked relief of the pain in the past.    She reports that the pain is worse with standing; walking; stairs.  It does bother the patient at night.    negative for mechanical symptoms, negative forinstability; + swelling    It does affect the performance of ADLs. It does currently affect the ability to perform exercises or recreational activities which include: walking.    Occupation: retired    History of Right TKA (~2001 by Dr. Welch at [facility]) That knee is doing well, although she did not regain all of her flexion      Past Medical History:   Diagnosis Date    Anemia     Anxiety     Asthma     Cataract     Colon polyps     Diverticulosis     Diverticulosis of large intestine without hemorrhage 8/17/2015    DM (diabetes mellitus) 4/25/2012    Encounter for blood transfusion     External hemorrhoids     GERD (gastroesophageal reflux disease) 8/16/2015    Hemorrhoids     Hypertension     Internal hemorrhoids     Left knee DJD 2/11/2015    Migraines     Sciatica, left side 3/22/2017       Past Surgical History:   Procedure Laterality Date    COLONOSCOPY  2010    HYSTERECTOMY      JOINT REPLACEMENT      right knee       Family History   Problem Relation Age of Onset    Heart disease Mother     Hypertension Father     Diabetes Father     Colon polyps Father     Colon cancer Neg Hx     Stomach cancer Neg Hx          Current Outpatient Prescriptions:     alprazolam (XANAX) 0.25 MG  tablet, TAKE ONE TABLET FOUR TIMES DAILY, Disp: 120 tablet, Rfl: 1    benazepril-hydrochlorthiazide (LOTENSIN HCT) 10-12.5 mg Tab, Take 1 tablet by mouth once daily., Disp: 90 tablet, Rfl: 3    cephALEXin (KEFLEX) 500 MG capsule, Take 1 capsule (500 mg total) by mouth every 12 (twelve) hours., Disp: 14 capsule, Rfl: 0    cyclobenzaprine (FLEXERIL) 10 MG tablet, Take 1 tablet (10 mg total) by mouth 3 (three) times daily as needed., Disp: 90 tablet, Rfl: 5    donepezil (ARICEPT) 10 MG tablet, Take 1 tablet (10 mg total) by mouth every evening., Disp: 30 tablet, Rfl: 11    fluticasone-salmeterol (ADVAIR DISKUS) 250-50 mcg/dose diskus inhaler, Twice a day, Disp: , Rfl:     gabapentin (NEURONTIN) 300 MG capsule, Take 1 capsule (300 mg total) by mouth 2 (two) times daily., Disp: 60 capsule, Rfl: 0    meloxicam (MOBIC) 15 MG tablet, Take 1 tablet (15 mg total) by mouth once daily., Disp: 30 tablet, Rfl: 3    metformin (GLUCOPHAGE) 500 MG tablet, TAKE TWO TABLETS BY MOUTH EVERY MORNING and 3t EVERY EVENING, Disp: 150 tablet, Rfl: 11    MULTIVITAMIN ORAL, Every day, Disp: , Rfl:     tramadol (ULTRAM) 50 mg tablet, TAKE ONE TABLET BY MOUTH THREE TIMES DAILY AS NEEDED, Disp: 90 tablet, Rfl: 1    aspirin (ECOTRIN) 325 MG EC tablet, Take 1 tablet (325 mg total) by mouth 2 (two) times daily., Disp: 60 tablet, Rfl: 0    docusate sodium (COLACE) 100 MG capsule, Take 1 capsule (100 mg total) by mouth 2 (two) times daily., Disp: , Rfl: 0    omeprazole (PRILOSEC) 40 MG capsule, Take 1 capsule (40 mg total) by mouth once daily., Disp: 30 capsule, Rfl: 11    Review of patient's allergies indicates:   Allergen Reactions    Codeine      Other reaction(s): Unknown    Iodine      Other reaction(s): Unknown       Review of Systems   Constitutional: Negative for diaphoresis and fever.        Difficulty sleeping   HENT: Positive for congestion. Negative for ear pain, hearing loss and tinnitus.         Taste change   Eyes:  Negative for pain and redness.        Change in vision   Respiratory: Negative for cough and shortness of breath.         Allergies; asthma   Cardiovascular: Negative for chest pain and palpitations.        HTN   Gastrointestinal: Positive for constipation. Negative for blood in stool, diarrhea, nausea and vomiting.        Hemorrhoids; difficulty swallowing   Genitourinary: Positive for frequency. Negative for dysuria and hematuria.        Incontinence   Musculoskeletal: Positive for joint pain and myalgias. Negative for back pain.   Skin: Positive for rash. Negative for itching.   Neurological: Positive for tingling, weakness and headaches. Negative for dizziness and seizures.   Endo/Heme/Allergies: Negative for environmental allergies.        Anemia; diabetes   Psychiatric/Behavioral: The patient is nervous/anxious.    All other systems reviewed and are negative.      OBJECTIVE:  Vitals:    07/24/17 1107   BP: (!) 145/86   Pulse: 76       PHYSICAL EXAMINATION    General:  The patient is alert and oriented x 3.  Mood is pleasant.  Observation of ears, eyes and nose reveal no gross abnormalities.  No labored breathing observed.    Left KNEE EXAMINATION     OBSERVATION / INSPECTION   Gait:   Mildly antalgic   Alignment:  Left varus  Scars:   None   Muscle atrophy: Mild  Effusion:  Mild   Warmth:  None   Discoloration:   none     TENDERNESS / CREPITUS (T / C):          T / C      T / C   Patella   ++/ -   Lateral joint line   - / -    Peripatellar medial  +  Medial joint line    +++ / -    Peripatellar lateral -  Medial plica   - / -    Patellar tendon -   Popliteal fossa   - / -    Quad tendon   -   Gastrocnemius   -   Prepatellar Bursa - / -   Quadricep   -   Tibial tubercle  -  Thigh/hamstring  -   Pes anserine/HS -  Fibula    -   ITB   - / -  Tibia     -   Tib/fib joint  - / -  LCL    -     MFC   - / -   MCL: Proximal  -    LFC   - / -    Distal   -          ROM: (* = pain)  PASSIVE   ACTIVE    Left :   0 /  120*    0 / 120*     Right :     0 / 110    0 / 110    Patellofemoral examination:  See above noted areas of tenderness.   Patella position    Subluxation / dislocation: Centered           Sup. / Inf;   Normal   Crepitus (PF):    ++  Patellar Mobility:       Medial-lateral:   Normal    Superior-inferior:  Normal    Inferior tilt   Normal    Patellar tendon:  Normal   Lateral tilt:    Normal   J-sign:     None   Patellofemoral grind:   ++ pain       MENISCAL SIGNS:     Pain on terminal extension:  -  Pain on terminal flexion:  +  Lindseys maneuver:  + for pain medially on left      LIGAMENT EXAMINATION:  ACL / Lachman:  normal (-1 to 2mm)    PCL-Post.  drawer: normal 0 to 2mm  MCL- Valgus:  normal 0 to 2mm with pain  LCL- Varus:    normal 0 to 2mm    STRENGTH: (* = with pain) PAINFUL SIDE   Quadricep   5/5   Hamstrin/5    EXTREMITY NEURO-VASCULAR EXAMINATION:   Sensation:  Grossly intact to light touch all dermatomal regions.   Motor Function:  Fully intact motor function at hip, knee, foot and ankle      Vascular status:  DP and PT pulses 2+, brisk capillary refill, symmetric.        Xrays: reviewed personally by me (standing AP/flexion, lateral, sunrise) show:   Findings: AP and PA flexion standing views of both knees as well as lateral and Merchant views of the right and a Merchant view of the left knee were obtained. There are postoperative changes of right total knee replacement. Prosthesis position and   alignment are satisfactory with no radiographic evidence for loosening or other complicating process. Tricompartmental degenerative changes present in the contralateral left knee. No acute fracture or dislocation.       ASSESSMENT:    Left Knee osteoarthritis with exacerbation of symptoms  Left knee varus deformity secondary to medial compartment end stage arthritis  S/p right TKA: doing well    PLAN:   We reviewed with Cheryl today, the pathology and natural history of her diagnosis. We have  discussed a variety of treatment options including medications, physical therapy and other alternative treatments. I also explained the indications, risks and benefits of surgery. After discussion, Cheryl decided to proceed with surgery. The decision was made to go forward with     Left knee TKA (Attune Depuy)  DOS: 14 August 2017 ([facility])    The details of the surgical procedure were explained, including the location of probable incisions and a description of likely hardware and/or grafts to be used.  The patient understands the likely convalescence after surgery.  Also, we have thoroughly discussed the risks, benefits and alternatives to surgery, including, but not limited to, the risk of infection, joint stiffness, blood clot (including DVT and/or pulmonary embolus), neurologic and vascular injury.  It was explained that, if tissue has been repaired or reconstructed, there is a chance of failure, which may require further management.      All of the patient's questions were answered and informed consent was obtained. The patient will contact us if they have any questions or concerns in the interim.              Answers for HPI/ROS submitted by the patient on 7/24/2017   Leg pain  unexpected weight change: No  appetite change : No  sleep disturbance: Yes  IMMUNOCOMPROMISED: No  dysphoric mood: No  visual disturbance: No  sinus pressure : Yes  food allergies: Yes  difficulty urinating: No  painful intercourse: No  numbness: No  joint swelling: Yes  Pain Chronicity: chronic  History of trauma: No  Onset: more than 1 year ago  Frequency: constantly  Progression since onset: rapidly worsening  Injury mechanism: twisting  injury location: at home  pain- numeric: 9/10  pain location: left knee  pain quality: sharp  Radiating Pain: No  Aggravating factors: bending  inability to bear weight: Yes  joint locking: No  limited range of motion: Yes  stiffness: Yes  Treatments tried: brace/corset, cold, heat, exercise, injection  treatment, movement, NSAIDs, OTC ointments, OTC pain meds  physical therapy: not tried  Improvement on treatment: no relief

## 2017-07-24 NOTE — H&P
CC:left KNEE pain    HISTORY OF PRESENT ILLNESS:  Cheryl Quiroga is a 72 y.o.  Female who reports left knee pain refractory to conservative management. She continues to report pain with weight bearing activities and at rest now. She would like to go forth with left TKA    History isnegative fortrauma but with pain for several years.    Today the patient rates pain at a 8/10 on visual analog scale.      The patient has tried previous viscosupplementation and injections and Mobic to make the pain better with marked relief of the pain in the past.    She reports that the pain is worse with standing; walking; stairs.  It does bother the patient at night.    negative for mechanical symptoms, negative forinstability; + swelling    It does affect the performance of ADLs. It does currently affect the ability to perform exercises or recreational activities which include: walking.    Occupation: retired    History of Right TKA (~2001 by Dr. Welch at [facility]) That knee is doing well, although she did not regain all of her flexion      Past Medical History:   Diagnosis Date    Anemia     Anxiety     Asthma     Cataract     Colon polyps     Diverticulosis     Diverticulosis of large intestine without hemorrhage 8/17/2015    DM (diabetes mellitus) 4/25/2012    Encounter for blood transfusion     External hemorrhoids     GERD (gastroesophageal reflux disease) 8/16/2015    Hemorrhoids     Hypertension     Internal hemorrhoids     Left knee DJD 2/11/2015    Migraines     Sciatica, left side 3/22/2017       Past Surgical History:   Procedure Laterality Date    COLONOSCOPY  2010    HYSTERECTOMY      JOINT REPLACEMENT      right knee       Family History   Problem Relation Age of Onset    Heart disease Mother     Hypertension Father     Diabetes Father     Colon polyps Father     Colon cancer Neg Hx     Stomach cancer Neg Hx          Current Outpatient Prescriptions:     alprazolam (XANAX) 0.25 MG  tablet, TAKE ONE TABLET FOUR TIMES DAILY, Disp: 120 tablet, Rfl: 1    benazepril-hydrochlorthiazide (LOTENSIN HCT) 10-12.5 mg Tab, Take 1 tablet by mouth once daily., Disp: 90 tablet, Rfl: 3    cephALEXin (KEFLEX) 500 MG capsule, Take 1 capsule (500 mg total) by mouth every 12 (twelve) hours., Disp: 14 capsule, Rfl: 0    cyclobenzaprine (FLEXERIL) 10 MG tablet, Take 1 tablet (10 mg total) by mouth 3 (three) times daily as needed., Disp: 90 tablet, Rfl: 5    donepezil (ARICEPT) 10 MG tablet, Take 1 tablet (10 mg total) by mouth every evening., Disp: 30 tablet, Rfl: 11    fluticasone-salmeterol (ADVAIR DISKUS) 250-50 mcg/dose diskus inhaler, Twice a day, Disp: , Rfl:     gabapentin (NEURONTIN) 300 MG capsule, Take 1 capsule (300 mg total) by mouth 2 (two) times daily., Disp: 60 capsule, Rfl: 0    meloxicam (MOBIC) 15 MG tablet, Take 1 tablet (15 mg total) by mouth once daily., Disp: 30 tablet, Rfl: 3    metformin (GLUCOPHAGE) 500 MG tablet, TAKE TWO TABLETS BY MOUTH EVERY MORNING and 3t EVERY EVENING, Disp: 150 tablet, Rfl: 11    MULTIVITAMIN ORAL, Every day, Disp: , Rfl:     tramadol (ULTRAM) 50 mg tablet, TAKE ONE TABLET BY MOUTH THREE TIMES DAILY AS NEEDED, Disp: 90 tablet, Rfl: 1    aspirin (ECOTRIN) 325 MG EC tablet, Take 1 tablet (325 mg total) by mouth 2 (two) times daily., Disp: 60 tablet, Rfl: 0    docusate sodium (COLACE) 100 MG capsule, Take 1 capsule (100 mg total) by mouth 2 (two) times daily., Disp: , Rfl: 0    omeprazole (PRILOSEC) 40 MG capsule, Take 1 capsule (40 mg total) by mouth once daily., Disp: 30 capsule, Rfl: 11    Review of patient's allergies indicates:   Allergen Reactions    Codeine      Other reaction(s): Unknown    Iodine      Other reaction(s): Unknown       Review of Systems   Constitutional:        Difficulty sleeping   HENT: Positive for congestion.         Taste change   Eyes:        Change in vision   Respiratory:        Allergies; asthma   Cardiovascular:        HTN    Gastrointestinal: Positive for constipation.        Hemorrhoids; difficulty swallowing   Genitourinary: Positive for frequency.        Incontinence   Musculoskeletal: Positive for joint pain and myalgias.   Skin: Positive for rash.   Neurological: Positive for headaches.   Endo/Heme/Allergies:        Anemia; diabetes   All other systems reviewed and are negative.      OBJECTIVE:  Vitals:    07/24/17 1107   BP: (!) 145/86   Pulse: 76       PHYSICAL EXAMINATION    General:  The patient is alert and oriented x 3.  Mood is pleasant.  Observation of ears, eyes and nose reveal no gross abnormalities.  No labored breathing observed.    Left KNEE EXAMINATION     OBSERVATION / INSPECTION   Gait:   Mildly antalgic   Alignment:  Left varus  Scars:   None   Muscle atrophy: Mild  Effusion:  Mild   Warmth:  None   Discoloration:   none     TENDERNESS / CREPITUS (T / C):          T / C      T / C   Patella   ++/ -   Lateral joint line   - / -    Peripatellar medial  +  Medial joint line    +++ / -    Peripatellar lateral -  Medial plica   - / -    Patellar tendon -   Popliteal fossa   - / -    Quad tendon   -   Gastrocnemius   -   Prepatellar Bursa - / -   Quadricep   -   Tibial tubercle  -  Thigh/hamstring  -   Pes anserine/HS -  Fibula    -   ITB   - / -  Tibia     -   Tib/fib joint  - / -  LCL    -     MFC   - / -   MCL: Proximal  -    LFC   - / -    Distal   -          ROM: (* = pain)  PASSIVE   ACTIVE    Left :   0 / 120*    0 / 120*     Right :     0 / 110    0 / 110    Patellofemoral examination:  See above noted areas of tenderness.   Patella position    Subluxation / dislocation: Centered           Sup. / Inf;   Normal   Crepitus (PF):    ++  Patellar Mobility:       Medial-lateral:   Normal    Superior-inferior:  Normal    Inferior tilt   Normal    Patellar tendon:  Normal   Lateral tilt:    Normal   J-sign:     None   Patellofemoral grind:   ++ pain       MENISCAL SIGNS:     Pain on terminal extension:  -  Pain on  terminal flexion:  +  Lindseys maneuver:  + for pain medially on left      LIGAMENT EXAMINATION:  ACL / Lachman:  normal (-1 to 2mm)    PCL-Post.  drawer: normal 0 to 2mm  MCL- Valgus:  normal 0 to 2mm with pain  LCL- Varus:    normal 0 to 2mm    STRENGTH: (* = with pain) PAINFUL SIDE   Quadricep   5/5   Hamstrin/5    EXTREMITY NEURO-VASCULAR EXAMINATION:   Sensation:  Grossly intact to light touch all dermatomal regions.   Motor Function:  Fully intact motor function at hip, knee, foot and ankle      Vascular status:  DP and PT pulses 2+, brisk capillary refill, symmetric.        Xrays: reviewed personally by me (standing AP/flexion, lateral, sunrise) show:   Findings: AP and PA flexion standing views of both knees as well as lateral and Merchant views of the right and a Merchant view of the left knee were obtained. There are postoperative changes of right total knee replacement. Prosthesis position and   alignment are satisfactory with no radiographic evidence for loosening or other complicating process. Tricompartmental degenerative changes present in the contralateral left knee. No acute fracture or dislocation.       ASSESSMENT:    Left Knee osteoarthritis with exacerbation of symptoms  Left knee varus deformity secondary to medial compartment end stage arthritis  S/p right TKA: doing well    PLAN:   We reviewed with Cheryl today, the pathology and natural history of her diagnosis. We have discussed a variety of treatment options including medications, physical therapy and other alternative treatments. I also explained the indications, risks and benefits of surgery. After discussion, Cheryl decided to proceed with surgery. The decision was made to go forward with     Left knee TKA (Attune Depuy)  DOS: 2017 ([facility])    The details of the surgical procedure were explained, including the location of probable incisions and a description of likely hardware and/or grafts to be used.  The patient  understands the likely convalescence after surgery.  Also, we have thoroughly discussed the risks, benefits and alternatives to surgery, including, but not limited to, the risk of infection, joint stiffness, blood clot (including DVT and/or pulmonary embolus), neurologic and vascular injury.  It was explained that, if tissue has been repaired or reconstructed, there is a chance of failure, which may require further management.      All of the patient's questions were answered and informed consent was obtained. The patient will contact us if they have any questions or concerns in the interim.

## 2017-07-25 DIAGNOSIS — G89.18 POST-OP PAIN: Primary | ICD-10-CM

## 2017-07-25 DIAGNOSIS — Z96.652 S/P TOTAL KNEE ARTHROPLASTY, LEFT: ICD-10-CM

## 2017-08-14 ENCOUNTER — TELEPHONE (OUTPATIENT)
Dept: FAMILY MEDICINE | Facility: CLINIC | Age: 72
End: 2017-08-14

## 2017-08-14 DIAGNOSIS — R93.89 ABNORMAL CXR: Primary | ICD-10-CM

## 2017-08-14 PROBLEM — M17.12 PRIMARY OSTEOARTHRITIS OF LEFT KNEE: Status: ACTIVE | Noted: 2017-08-14

## 2017-08-14 PROBLEM — Z96.659 S/P TKR (TOTAL KNEE REPLACEMENT) USING CEMENT: Status: ACTIVE | Noted: 2017-08-14

## 2017-08-16 NOTE — TELEPHONE ENCOUNTER
Patient stated she will call back to schedule CT after she finds out her physical therapy schedule. Patient aware order is in the system.

## 2017-08-17 ENCOUNTER — TELEPHONE (OUTPATIENT)
Dept: ORTHOPEDICS | Facility: CLINIC | Age: 72
End: 2017-08-17

## 2017-08-17 NOTE — TELEPHONE ENCOUNTER
----- Message from Corinne Magdaleno sent at 8/17/2017 10:50 AM CDT -----  Contact: Vikki/sister  Patient needs to the name and number of the location where she was referred to for Physical Therapy, Please call her at [phone].    Thanks  td

## 2017-08-17 NOTE — TELEPHONE ENCOUNTER
----- Message from Vikki Solitario sent at 8/17/2017  2:27 PM CDT -----  Patient wants to know where she could get the equipment to use on her knee.  Call her at [phone] or [phone].                                                         [place]

## 2017-08-22 ENCOUNTER — OFFICE VISIT (OUTPATIENT)
Dept: FAMILY MEDICINE | Facility: CLINIC | Age: 72
End: 2017-08-22
Payer: MEDICARE

## 2017-08-22 VITALS
WEIGHT: 180.75 LBS | HEART RATE: 107 BPM | SYSTOLIC BLOOD PRESSURE: 119 MMHG | DIASTOLIC BLOOD PRESSURE: 72 MMHG | HEIGHT: 65 IN | BODY MASS INDEX: 30.12 KG/M2

## 2017-08-22 DIAGNOSIS — Z96.659: ICD-10-CM

## 2017-08-22 DIAGNOSIS — R93.89 ABNORMAL CXR: Primary | ICD-10-CM

## 2017-08-22 PROCEDURE — 99999 PR PBB SHADOW E&M-EST. PATIENT-LVL II: CPT | Mod: PBBFAC,,, | Performed by: FAMILY MEDICINE

## 2017-08-22 PROCEDURE — 3074F SYST BP LT 130 MM HG: CPT | Mod: ,,, | Performed by: FAMILY MEDICINE

## 2017-08-22 PROCEDURE — 3078F DIAST BP <80 MM HG: CPT | Mod: ,,, | Performed by: FAMILY MEDICINE

## 2017-08-22 PROCEDURE — 99212 OFFICE O/P EST SF 10 MIN: CPT | Mod: PBBFAC,PO | Performed by: FAMILY MEDICINE

## 2017-08-22 PROCEDURE — 99213 OFFICE O/P EST LOW 20 MIN: CPT | Mod: 24,S$PBB,, | Performed by: FAMILY MEDICINE

## 2017-08-22 PROCEDURE — 1159F MED LIST DOCD IN RCRD: CPT | Mod: ,,, | Performed by: FAMILY MEDICINE

## 2017-08-22 NOTE — PROGRESS NOTES
Cheryl Quiroga presents with abnl CXR cw calcified granuloma. Hx asthma no sig change     Past Medical History:   Diagnosis Date    Anemia     Anxiety     Asthma     Cataract     Colon polyps     Diverticulosis     Diverticulosis of large intestine without hemorrhage 8/17/2015    DM (diabetes mellitus) 4/25/2012    Encounter for blood transfusion     External hemorrhoids     GERD (gastroesophageal reflux disease) 8/16/2015    Hemorrhoids     Hypertension     Internal hemorrhoids     Left knee DJD 2/11/2015    Migraines     Sciatica, left side 3/22/2017     Past Surgical History:   Procedure Laterality Date    COLONOSCOPY  2010    HYSTERECTOMY      JOINT REPLACEMENT      right knee     Review of patient's allergies indicates:   Allergen Reactions    Codeine      Other reaction(s): Unknown    Iodine      Other reaction(s): Unknown     Current Outpatient Prescriptions on File Prior to Visit   Medication Sig Dispense Refill    alprazolam (XANAX) 0.25 MG tablet TAKE ONE TABLET FOUR TIMES DAILY (Patient taking differently: TAKE ONE TABLET FOUR TIMES DAILY  as needed) 120 tablet 1    aspirin 325 MG tablet Take 1 tablet (325 mg total) by mouth 2 (two) times daily. 60 tablet 0    benazepril-hydrochlorthiazide (LOTENSIN HCT) 10-12.5 mg Tab Take 1 tablet by mouth once daily. 90 tablet 3    cyclobenzaprine (FLEXERIL) 10 MG tablet Take 1 tablet (10 mg total) by mouth 3 (three) times daily as needed. 90 tablet 5    docusate sodium (COLACE) 100 MG capsule Take 1 capsule (100 mg total) by mouth 2 (two) times daily.  0    donepezil (ARICEPT) 10 MG tablet Take 1 tablet (10 mg total) by mouth every evening. 30 tablet 11    fluticasone-salmeterol (ADVAIR DISKUS) 250-50 mcg/dose diskus inhaler Inhale 1 puff into the lungs 2 (two) times daily. Twice a day      gabapentin (NEURONTIN) 300 MG capsule Take 1 capsule (300 mg total) by mouth 2 (two) times daily. 60 capsule 0    HYDROmorphone (DILAUDID) 2 MG  tablet Take 1 tablet (2 mg total) by mouth every 4 to 6 hours as needed for Pain. 90 tablet 0    metformin (GLUCOPHAGE) 500 MG tablet TAKE TWO TABLETS BY MOUTH EVERY MORNING and 3t EVERY EVENING 150 tablet 11    MULTIVITAMIN ORAL Take 1 tablet by mouth every morning. Every day       No current facility-administered medications on file prior to visit.      Social History     Social History    Marital status:      Spouse name: N/A    Number of children: N/A    Years of education: N/A     Occupational History    Not on file.     Social History Main Topics    Smoking status: Never Smoker    Smokeless tobacco: Never Used    Alcohol use No    Drug use: No    Sexual activity: Yes     Partners: Male     Other Topics Concern    Not on file     Social History Narrative    No narrative on file     Family History   Problem Relation Age of Onset    Heart disease Mother     Hypertension Father     Diabetes Father     Colon polyps Father     Colon cancer Neg Hx     Stomach cancer Neg Hx          ROS:  SKIN: No rashes, itching or changes in color or texture of skin.  EYES: Visual acuity fine. No photophobia, ocular pain or diplopia.EARS: Denies ear pain, discharge or vertigo.NOSE: No loss of smell, no epistaxis some postnasal drip.MOUTH & THROAT: No hoarseness or change in voice. No excessive gum bleeding.CHEST: Denies GARCIA, cyanosis, wheezing  CARDIOVASCULAR: Denies chest pain, PND, orthopnea or reduced exercise tolerance.  ABDOMEN:  No weight loss.No abdominal pain, no hematemesis or blood in stool.  URINARY: No flank pain, dysuria or hematuria.  PERIPHERAL VASCULAR: No claudication or cyanosis.  MUSCULOSKELETAL: Negative   NEUROLOGIC: No history of seizures, paralysis, alteration of gait or coordination.    PE: Vital signs as noted  Heent:Normocephalic with no recent cranial trauma,PERRLA,EOMI,conjunctiva clear,fundi reveal no hemmorhage exudate or papilledema.Otic canals clear, tympanic membranes  slightly dull bilaterally.Nasal mucosa slightly red and edematous.Posterior pharynx slightly red but without exudate.  Neck:Supple with minimal anterior cervical adenopathy.  Chest:Clear bilateral breath sounds    Heart:Regular rhthym without murmer  Abdomen:Soft, non tender,no masses, no hepatosplenomegaly   Impression:Abnl CXR  Asthma  SP Lt TKR   Plan: CT chest

## 2017-08-29 DIAGNOSIS — Z96.652 S/P TOTAL KNEE ARTHROPLASTY, LEFT: Primary | ICD-10-CM

## 2017-08-30 ENCOUNTER — HOSPITAL ENCOUNTER (OUTPATIENT)
Dept: RADIOLOGY | Facility: HOSPITAL | Age: 72
Discharge: HOME | End: 2017-08-30
Attending: ORTHOPAEDIC SURGERY
Payer: MEDICARE

## 2017-08-30 ENCOUNTER — OFFICE VISIT (OUTPATIENT)
Dept: ORTHOPEDICS | Facility: CLINIC | Age: 72
End: 2017-08-30
Payer: MEDICARE

## 2017-08-30 VITALS
BODY MASS INDEX: 29.46 KG/M2 | SYSTOLIC BLOOD PRESSURE: 115 MMHG | HEIGHT: 65 IN | HEART RATE: 119 BPM | WEIGHT: 176.81 LBS | DIASTOLIC BLOOD PRESSURE: 78 MMHG

## 2017-08-30 DIAGNOSIS — Z96.652 S/P TOTAL KNEE ARTHROPLASTY, LEFT: ICD-10-CM

## 2017-08-30 DIAGNOSIS — G89.18 POST-OP PAIN: ICD-10-CM

## 2017-08-30 DIAGNOSIS — Z96.652 S/P TKR (TOTAL KNEE REPLACEMENT) USING CEMENT, LEFT: Primary | ICD-10-CM

## 2017-08-30 PROBLEM — M17.12 PRIMARY OSTEOARTHRITIS OF LEFT KNEE: Status: RESOLVED | Noted: 2017-08-14 | Resolved: 2017-08-30

## 2017-08-30 PROCEDURE — 99024 POSTOP FOLLOW-UP VISIT: CPT | Mod: ,,, | Performed by: ORTHOPAEDIC SURGERY

## 2017-08-30 PROCEDURE — 73562 X-RAY EXAM OF KNEE 3: CPT | Mod: 26,LT,, | Performed by: RADIOLOGY

## 2017-08-30 PROCEDURE — 73562 X-RAY EXAM OF KNEE 3: CPT | Mod: TC,PO,LT

## 2017-08-30 PROCEDURE — 99999 PR PBB SHADOW E&M-EST. PATIENT-LVL III: CPT | Mod: PBBFAC,,, | Performed by: ORTHOPAEDIC SURGERY

## 2017-08-30 PROCEDURE — 99213 OFFICE O/P EST LOW 20 MIN: CPT | Mod: PBBFAC,25,PO | Performed by: ORTHOPAEDIC SURGERY

## 2017-08-30 RX ORDER — HYDROMORPHONE HYDROCHLORIDE 2 MG/1
2 TABLET ORAL EVERY 6 HOURS PRN
Qty: 60 TABLET | Refills: 0 | Status: SHIPPED | OUTPATIENT
Start: 2017-08-30 | End: 2017-10-17

## 2017-08-30 NOTE — PROGRESS NOTES
"Subjective:          Chief Complaint: Cheryl Quiroga is a 72 y.o. female who had concerns including Post-op Evaluation of the Left Knee.    Mrs. Quiroga is here today for f/u after her left knee surgery. She is doing well today. Pain 7/10. She is using her cane and attending therapy as directed.    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     PROCEDURES PERFORMED: Replacement, Knee, Medial and Lateral compartment (Total Knee) 97209 and femoral autologous bone grafting     ANESTHESIA: Local, Regional w/ catheter  and spinal/epidural     FLUIDS IN THE CASE: 600 ml     ESTIMATED BLOOD LOSS: 100ml     URINE OUTPUT: N/A     COMPLICATIONS: none     CONDITION ON RETURN TO RECOVERY ROOM: Good     IMPLANTS UTILIZED:  left   Depuy total knee  Depuy ATTUNE femoral 5  left  Depuy ATTUNE tibial 5     Depuy fixed insert size 5", 5 mm thick,  ATTUNE 35 mm medialized patellar component; SmartSet with gentamicin was utilized on tibial and patellar components.           ROS                Objective:        General: Cheryl is well-developed, well-nourished, appears stated age, in no acute distress, alert and oriented to time, place and person.     General    Vitals reviewed.  Constitutional: She is oriented to person, place, and time. She appears well-developed and well-nourished. No distress.   HENT:   Head: Normocephalic and atraumatic.   Nose: Nose normal.   Eyes: Pupils are equal, round, and reactive to light.   Cardiovascular: Normal rate.    Pulmonary/Chest: Effort normal.   Neurological: She is alert and oriented to person, place, and time.   Psychiatric: She has a normal mood and affect. Her behavior is " normal. Judgment and thought content normal.     General Musculoskeletal Exam   Gait: antalgic       Right Knee Exam     Inspection   Scars: present    Left Knee Exam     Inspection   Erythema: absent  Scars: present  Swelling: absent  Effusion: present  Deformity: deformity  Bruising: absent    Tenderness   The patient tender to palpation of the patella, medial retinaculum, lateral retinaculum and tibial tubercle.    Range of Motion   Extension:  5 abnormal   Flexion:  110 abnormal     Tests   Patella   Passive Patellar Tilt: neutral  Patellar Glide (Quadrants): Lateral - 1 Medial - 1  Patellar Grind: negative    Other   Sensation: normal    Muscle Strength   Left Lower Extremity   Hip Abduction: 5/5   Quadriceps:  5/5   Hamstrin/5     Vascular Exam       Left Pulses  Dorsalis Pedis:      2+  Posterior Tibial:      2+          Current and previous radiographic studies and results were reviewed with the patient:   Findings: A left total knee arthroplasty is in place.  No hardware failure or loosening.  No periprosthetic fracture.  There is persistent soft tissue swelling along with a joint effusion.  The air within the soft tissues has resolved in the interval.      Assessment:       Encounter Diagnoses   Name Primary?    Post-op pain     S/P TKR (total knee replacement) using cement, left Yes          Plan:         Continue with therapy  Continue with pain control, will adjust dosage  Continue with cane use  F/U in 2 weeks

## 2017-09-13 ENCOUNTER — OFFICE VISIT (OUTPATIENT)
Dept: FAMILY MEDICINE | Facility: CLINIC | Age: 72
End: 2017-09-13
Payer: MEDICARE

## 2017-09-13 ENCOUNTER — TELEPHONE (OUTPATIENT)
Dept: FAMILY MEDICINE | Facility: CLINIC | Age: 72
End: 2017-09-13

## 2017-09-13 VITALS
DIASTOLIC BLOOD PRESSURE: 76 MMHG | HEART RATE: 84 BPM | BODY MASS INDEX: 28.22 KG/M2 | SYSTOLIC BLOOD PRESSURE: 113 MMHG | HEIGHT: 65 IN | TEMPERATURE: 98 F | WEIGHT: 169.38 LBS

## 2017-09-13 DIAGNOSIS — K52.9 GASTROENTERITIS: Primary | ICD-10-CM

## 2017-09-13 PROCEDURE — 99213 OFFICE O/P EST LOW 20 MIN: CPT | Mod: PBBFAC,PO | Performed by: FAMILY MEDICINE

## 2017-09-13 PROCEDURE — 99213 OFFICE O/P EST LOW 20 MIN: CPT | Mod: S$PBB,,, | Performed by: FAMILY MEDICINE

## 2017-09-13 PROCEDURE — 3078F DIAST BP <80 MM HG: CPT | Mod: ,,, | Performed by: FAMILY MEDICINE

## 2017-09-13 PROCEDURE — 1126F AMNT PAIN NOTED NONE PRSNT: CPT | Mod: ,,, | Performed by: FAMILY MEDICINE

## 2017-09-13 PROCEDURE — 99999 PR PBB SHADOW E&M-EST. PATIENT-LVL III: CPT | Mod: PBBFAC,,, | Performed by: FAMILY MEDICINE

## 2017-09-13 PROCEDURE — 3074F SYST BP LT 130 MM HG: CPT | Mod: ,,, | Performed by: FAMILY MEDICINE

## 2017-09-13 PROCEDURE — 1159F MED LIST DOCD IN RCRD: CPT | Mod: ,,, | Performed by: FAMILY MEDICINE

## 2017-09-13 PROCEDURE — 96372 THER/PROPH/DIAG INJ SC/IM: CPT | Mod: PBBFAC,PO

## 2017-09-13 RX ORDER — HYDROXYZINE PAMOATE 25 MG/1
25 CAPSULE ORAL EVERY 8 HOURS PRN
Qty: 30 CAPSULE | Refills: 1 | Status: SHIPPED | OUTPATIENT
Start: 2017-09-13 | End: 2017-10-17

## 2017-09-13 RX ORDER — HYDROXYZINE 50 MG/ML
25 INJECTION, SOLUTION INTRAMUSCULAR
Status: DISCONTINUED | OUTPATIENT
Start: 2017-09-13 | End: 2017-09-14

## 2017-09-13 RX ADMIN — HYDROXYZINE HYDROCHLORIDE 25 MG: 25 INJECTION, SOLUTION INTRAMUSCULAR at 11:09

## 2017-09-13 NOTE — PROGRESS NOTES
The patient presents with a recent history of nausea vomiting and diarrhea.No hematemesis,melena nor severe abdominal cramps.Still able to tolerate liquids somewhat.  Past Medical History:  Past Medical History:   Diagnosis Date    Anemia     Anxiety     Asthma     Cataract     Colon polyps     Diverticulosis     Diverticulosis of large intestine without hemorrhage 8/17/2015    DM (diabetes mellitus) 4/25/2012    Encounter for blood transfusion     External hemorrhoids     GERD (gastroesophageal reflux disease) 8/16/2015    Hemorrhoids     Hypertension     Internal hemorrhoids     Left knee DJD 2/11/2015    Migraines     Sciatica, left side 3/22/2017     Past Surgical History:   Procedure Laterality Date    COLONOSCOPY  2010    HYSTERECTOMY      JOINT REPLACEMENT      right knee     Review of patient's allergies indicates:   Allergen Reactions    Codeine      Other reaction(s): Unknown    Iodine      Other reaction(s): Unknown     Current Outpatient Prescriptions on File Prior to Visit   Medication Sig Dispense Refill    alprazolam (XANAX) 0.25 MG tablet TAKE ONE TABLET FOUR TIMES DAILY (Patient taking differently: TAKE ONE TABLET FOUR TIMES DAILY  as needed) 120 tablet 1    aspirin 325 MG tablet Take 1 tablet (325 mg total) by mouth 2 (two) times daily. 60 tablet 0    benazepril-hydrochlorthiazide (LOTENSIN HCT) 10-12.5 mg Tab Take 1 tablet by mouth once daily. 90 tablet 3    cyclobenzaprine (FLEXERIL) 10 MG tablet Take 1 tablet (10 mg total) by mouth 3 (three) times daily as needed. 90 tablet 5    docusate sodium (COLACE) 100 MG capsule Take 1 capsule (100 mg total) by mouth 2 (two) times daily.  0    donepezil (ARICEPT) 10 MG tablet Take 1 tablet (10 mg total) by mouth every evening. 30 tablet 11    fluticasone-salmeterol (ADVAIR DISKUS) 250-50 mcg/dose diskus inhaler Inhale 1 puff into the lungs 2 (two) times daily. Twice a day      gabapentin (NEURONTIN) 300 MG capsule Take 1  capsule (300 mg total) by mouth 2 (two) times daily. 60 capsule 0    HYDROmorphone (DILAUDID) 2 MG tablet Take 1 tablet (2 mg total) by mouth every 6 (six) hours as needed for Pain. 60 tablet 0    metformin (GLUCOPHAGE) 500 MG tablet TAKE TWO TABLETS BY MOUTH EVERY MORNING and 3t EVERY EVENING 150 tablet 11    MULTIVITAMIN ORAL Take 1 tablet by mouth every morning. Every day       No current facility-administered medications on file prior to visit.      Social History     Social History    Marital status:      Spouse name: N/A    Number of children: N/A    Years of education: N/A     Occupational History    Not on file.     Social History Main Topics    Smoking status: Never Smoker    Smokeless tobacco: Never Used    Alcohol use No    Drug use: No    Sexual activity: Yes     Partners: Male     Other Topics Concern    Not on file     Social History Narrative    No narrative on file     Family History   Problem Relation Age of Onset    Heart disease Mother     Hypertension Father     Diabetes Father     Colon polyps Father     Colon cancer Neg Hx     Stomach cancer Neg Hx        ROS:  SKIN: No rashes, itching or changes in color or texture of skin.  EYES: Visual acuity fine. No photophobia, ocular pain or diplopia.EARS: Denies ear pain, discharge or vertigo.NOSE: No loss of smell, no epistaxis or postnasal drip.MOUTH & THROAT: No hoarseness or change in voice. No excessive gum bleeding.NODES: Denies swollen glands.  CHEST: Denies GARCIA, cyanosis, wheezing, cough and sputum production.  CARDIOVASCULAR: Denies chest pain, PND, orthopnea or reduced exercise tolerance.  ABDOMEN:  No weight loss.Mild abdominal pain, no hematemesis or blood in stool.  URINARY: No flank pain, dysuria or hematuria.  PERIPHERAL VASCULAR: No claudication or cyanosis.  MUSCULOSKELETAL: Negative   NEUROLOGIC: No history of seizures, paralysis, alteration of gait or coordination.    PE Vital signs noted  Heent:  Normocephalic,with no recent trauma,PERRLA,EOMI,conjunctiva clear with no exudate.Nasopharynx is not injected .Otic canal.TMs are not affected.Pharynx is slightly red.   Neck:Supple without adenopathy  Chest:Clear bilateral breath sounds normal  Heart:Regular rhthym without murmer  Abdomen:Soft, mild generalized tenderness,no rebound,no masses, no hepatosplenomegaly  Extremeties and Neurologic:Grossly within normal limits  Impression: Gastroenteritis 558.9  Plan:Clear liquid progressive diet as tolerated,Tylenol as needed for fever or mild pain,and observation.Consider Visteril prn n&v and Immodium AD/Lomotil if diarrhea worsens,notify us if not significantly better in 48 hours or if any worsening occurs.

## 2017-09-14 RX ORDER — HYDROXYZINE HYDROCHLORIDE 25 MG/ML
25 INJECTION, SOLUTION INTRAMUSCULAR
Status: COMPLETED | OUTPATIENT
Start: 2017-09-14 | End: 2017-09-13

## 2017-09-14 RX ORDER — HYDROXYZINE HYDROCHLORIDE 25 MG/ML
25 INJECTION, SOLUTION INTRAMUSCULAR
Status: CANCELLED | OUTPATIENT
Start: 2017-09-14 | End: 2017-09-14

## 2017-09-18 ENCOUNTER — OFFICE VISIT (OUTPATIENT)
Dept: ORTHOPEDICS | Facility: CLINIC | Age: 72
End: 2017-09-18
Payer: MEDICARE

## 2017-09-18 VITALS
HEIGHT: 65 IN | WEIGHT: 170 LBS | DIASTOLIC BLOOD PRESSURE: 62 MMHG | BODY MASS INDEX: 28.32 KG/M2 | SYSTOLIC BLOOD PRESSURE: 124 MMHG | HEART RATE: 78 BPM

## 2017-09-18 DIAGNOSIS — Z96.652 S/P TOTAL KNEE ARTHROPLASTY, LEFT: Primary | ICD-10-CM

## 2017-09-18 DIAGNOSIS — G89.18 POST-OP PAIN: ICD-10-CM

## 2017-09-18 PROCEDURE — 99213 OFFICE O/P EST LOW 20 MIN: CPT | Mod: PBBFAC,PO | Performed by: ORTHOPAEDIC SURGERY

## 2017-09-18 PROCEDURE — 99024 POSTOP FOLLOW-UP VISIT: CPT | Mod: ,,, | Performed by: ORTHOPAEDIC SURGERY

## 2017-09-18 PROCEDURE — 99999 PR PBB SHADOW E&M-EST. PATIENT-LVL III: CPT | Mod: PBBFAC,,, | Performed by: ORTHOPAEDIC SURGERY

## 2017-09-18 NOTE — PROGRESS NOTES
"Subjective:          Chief Complaint: Cheryl Quiroga is a 72 y.o. female who had concerns including Pain of the Left Knee.    Mrs. Quiroga is here today for f/u after her left knee surgery. She is doing well today. Pain 1/10. 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     PROCEDURES PERFORMED: Replacement, Knee, Medial and Lateral compartment (Total Knee) 38746 and femoral autologous bone grafting     ANESTHESIA: Local, Regional w/ catheter  and spinal/epidural     FLUIDS IN THE CASE: 600 ml     ESTIMATED BLOOD LOSS: 100ml     URINE OUTPUT: N/A     COMPLICATIONS: none     CONDITION ON RETURN TO RECOVERY ROOM: Good     IMPLANTS UTILIZED:  left   Depuy total knee  Depuy ATTUNE femoral 5  left  Depuy ATTUNE tibial 5     Depuy fixed insert size 5", 5 mm thick,  ATTUNE 35 mm medialized patellar component; SmartSet with gentamicin was utilized on tibial and patellar components.         Pain   Associated symptoms include nausea and vomiting.       Review of Systems   Musculoskeletal: Positive for joint pain, muscle weakness and stiffness.   Gastrointestinal: Positive for diarrhea, nausea and vomiting.                   Objective:        General: Cheryl is well-developed, well-nourished, appears stated age, in no acute distress, alert and oriented to time, place and person.     General    Vitals reviewed.  Constitutional: She is oriented to person, place, and time. She appears well-developed and well-nourished. No distress.   HENT:   Head: Normocephalic and atraumatic.   Nose: Nose normal. "   Eyes: Pupils are equal, round, and reactive to light.   Cardiovascular: Normal rate.    Pulmonary/Chest: Effort normal.   Neurological: She is alert and oriented to person, place, and time.   Psychiatric: She has a normal mood and affect. Her behavior is normal. Judgment and thought content normal.     General Musculoskeletal Exam   Gait: antalgic       Right Knee Exam     Inspection   Scars: present    Left Knee Exam     Inspection   Erythema: absent  Scars: present  Swelling: absent  Effusion: absent  Deformity: deformity  Bruising: absent    Tenderness   The patient tender to palpation of the tibial tubercle.    Range of Motion   Extension:  0 normal   Flexion:  110 abnormal     Tests   Meniscus   Lindsey:  Medial - negative Lateral - negative  Patella   Patellar Apprehension: negative  Passive Patellar Tilt: neutral  Patellar Tracking: normal  Patellar Glide (Quadrants): Lateral - 1 Medial - 2  Patellar Grind: negative    Other   Sensation: decreased    Comments:  Incisions are healing with some evidence of excess scarring    Muscle Strength   Left Lower Extremity   Hip Abduction: 5/5   Quadriceps:  5/5   Hamstrin/5     Vascular Exam       Left Pulses  Dorsalis Pedis:      2+  Posterior Tibial:      2+          previous radiographic studies and results were reviewed with the patient:   Findings: A left total knee arthroplasty is in place.  No hardware failure or loosening.  No periprosthetic fracture.  There is persistent soft tissue swelling along with a joint effusion.  The air within the soft tissues has resolved in the interval.      Assessment:       Encounter Diagnoses   Name Primary?    Post-op pain Yes    S/P total knee arthroplasty, left           Plan:         Return to therapy  Continue with pain control PRN  Continue with cane use  F/U in 6 weeks

## 2017-09-28 RX ORDER — METFORMIN HYDROCHLORIDE 500 MG/1
TABLET ORAL
Qty: 180 TABLET | Refills: 3 | Status: SHIPPED | OUTPATIENT
Start: 2017-09-28 | End: 2018-01-11 | Stop reason: SDUPTHER

## 2017-09-28 NOTE — TELEPHONE ENCOUNTER
----- Message from Janice Pack sent at 9/28/2017  9:11 AM CDT -----  Contact: pt  She's calling to get a refill...    1. What is the name of the medication you are requesting? Metformin  2. What is the dose? 500 mg  3. How do you take the medication? Orally, topically, etc? orally  4. How often do you take this medication? 3 in the morning and 3 at night  5. Do you need a 30 day or 90 day supply? 30  6. How many refills are you requesting? 1  7. What is your preferred pharmacy and location of the pharmacy? [facility] in [place]  8. Who can we contact with further questions? [phone] (home)  [phone](cell)

## 2017-10-12 RX ORDER — ALPRAZOLAM 0.25 MG/1
TABLET ORAL
Qty: 120 TABLET | Refills: 1 | Status: SHIPPED | OUTPATIENT
Start: 2017-10-12 | End: 2017-10-17

## 2017-10-12 RX ORDER — ALPRAZOLAM 0.25 MG/1
TABLET ORAL
Qty: 120 TABLET | OUTPATIENT
Start: 2017-10-12

## 2017-10-17 ENCOUNTER — OFFICE VISIT (OUTPATIENT)
Dept: FAMILY MEDICINE | Facility: CLINIC | Age: 72
End: 2017-10-17
Payer: MEDICARE

## 2017-10-17 VITALS
WEIGHT: 166 LBS | DIASTOLIC BLOOD PRESSURE: 76 MMHG | SYSTOLIC BLOOD PRESSURE: 132 MMHG | BODY MASS INDEX: 27.66 KG/M2 | HEIGHT: 65 IN | HEART RATE: 72 BPM | TEMPERATURE: 98 F

## 2017-10-17 DIAGNOSIS — E11.9 TYPE 2 DIABETES MELLITUS WITHOUT COMPLICATION, WITHOUT LONG-TERM CURRENT USE OF INSULIN: ICD-10-CM

## 2017-10-17 DIAGNOSIS — R10.9 ABDOMINAL PAIN, UNSPECIFIED ABDOMINAL LOCATION: ICD-10-CM

## 2017-10-17 DIAGNOSIS — R63.4 WEIGHT LOSS: Primary | ICD-10-CM

## 2017-10-17 DIAGNOSIS — R19.7 DIARRHEA, UNSPECIFIED TYPE: ICD-10-CM

## 2017-10-17 DIAGNOSIS — D64.9 ANEMIA, UNSPECIFIED TYPE: ICD-10-CM

## 2017-10-17 PROCEDURE — 99999 PR PBB SHADOW E&M-EST. PATIENT-LVL III: CPT | Mod: PBBFAC,,, | Performed by: FAMILY MEDICINE

## 2017-10-17 PROCEDURE — 99214 OFFICE O/P EST MOD 30 MIN: CPT | Mod: S$PBB,,, | Performed by: FAMILY MEDICINE

## 2017-10-17 PROCEDURE — 99213 OFFICE O/P EST LOW 20 MIN: CPT | Mod: PBBFAC,PO | Performed by: FAMILY MEDICINE

## 2017-10-17 RX ORDER — ONDANSETRON 4 MG/1
TABLET, ORALLY DISINTEGRATING ORAL
Refills: 0 | COMMUNITY
Start: 2017-09-11 | End: 2018-06-18

## 2017-10-17 NOTE — PROGRESS NOTES
The patient presents with a recent history of nausea vomiting and loose stool both green color which began ~6w ago. Not improving and now w wt loss. 2 m ago TKR . No hematemesis,melena nor severe abdominal cramps.Anorexic and vomiting is often 1 hr post prandial Past Medical History:  Past Medical History:   Diagnosis Date    Anemia     Anxiety     Asthma     Cataract     Colon polyps     Diverticulosis     Diverticulosis of large intestine without hemorrhage 8/17/2015    DM (diabetes mellitus) 4/25/2012    Encounter for blood transfusion     External hemorrhoids     GERD (gastroesophageal reflux disease) 8/16/2015    Hemorrhoids     Hypertension     Internal hemorrhoids     Left knee DJD 2/11/2015    Migraines     Sciatica, left side 3/22/2017     Past Surgical History:   Procedure Laterality Date    COLONOSCOPY  2010    HYSTERECTOMY      JOINT REPLACEMENT      right knee     Review of patient's allergies indicates:   Allergen Reactions    Codeine      Other reaction(s): Unknown    Iodine      Other reaction(s): Unknown     Current Outpatient Prescriptions on File Prior to Visit   Medication Sig Dispense Refill    alprazolam (XANAX) 0.25 MG tablet TAKE ONE TABLET FOUR TIMES DAILY (Patient taking differently: TAKE ONE TABLET FOUR TIMES DAILY  as needed) 120 tablet 1    alprazolam (XANAX) 0.25 MG tablet TAKE ONE TABLET FOUR TIMES DAILY 120 tablet 1    aspirin 325 MG tablet Take 1 tablet (325 mg total) by mouth 2 (two) times daily. 60 tablet 0    benazepril-hydrochlorthiazide (LOTENSIN HCT) 10-12.5 mg Tab Take 1 tablet by mouth once daily. 90 tablet 3    donepezil (ARICEPT) 10 MG tablet Take 1 tablet (10 mg total) by mouth every evening. 30 tablet 11    fluticasone-salmeterol (ADVAIR DISKUS) 250-50 mcg/dose diskus inhaler Inhale 1 puff into the lungs 2 (two) times daily. Twice a day      gabapentin (NEURONTIN) 300 MG capsule Take 1 capsule (300 mg total) by mouth 2 (two) times daily. 60  capsule 0    metformin (GLUCOPHAGE) 500 MG tablet TAKE three TABLETS BY MOUTH EVERY MORNING and 3 EVERY EVENING 180 tablet 3    MULTIVITAMIN ORAL Take 1 tablet by mouth every morning. Every day      cyclobenzaprine (FLEXERIL) 10 MG tablet Take 1 tablet (10 mg total) by mouth 3 (three) times daily as needed. 90 tablet 5    [DISCONTINUED] docusate sodium (COLACE) 100 MG capsule Take 1 capsule (100 mg total) by mouth 2 (two) times daily.  0    [DISCONTINUED] HYDROmorphone (DILAUDID) 2 MG tablet Take 1 tablet (2 mg total) by mouth every 6 (six) hours as needed for Pain. 60 tablet 0    [DISCONTINUED] hydrOXYzine pamoate (VISTARIL) 25 MG Cap Take 1 capsule (25 mg total) by mouth every 8 (eight) hours as needed. 30 capsule 1     No current facility-administered medications on file prior to visit.      Social History     Social History    Marital status:      Spouse name: N/A    Number of children: N/A    Years of education: N/A     Occupational History    Not on file.     Social History Main Topics    Smoking status: Never Smoker    Smokeless tobacco: Never Used    Alcohol use No    Drug use: No    Sexual activity: Yes     Partners: Male     Other Topics Concern    Not on file     Social History Narrative    No narrative on file     Family History   Problem Relation Age of Onset    Heart disease Mother     Hypertension Father     Diabetes Father     Colon polyps Father     Colon cancer Neg Hx     Stomach cancer Neg Hx        ROS:  SKIN: No rashes, itching or changes in color or texture of skin.  EYES: Visual acuity fine. No photophobia, ocular pain or diplopia.EARS: Denies ear pain, discharge or vertigo.NOSE: No loss of smell, no epistaxis or postnasal drip.MOUTH & THROAT: No hoarseness or change in voice. No excessive gum bleeding.NODES: Denies swollen glands.  CHEST: Denies GARCIA, cyanosis, wheezing, cough and sputum production.  CARDIOVASCULAR: Denies chest pain, PND, orthopnea or reduced  exercise tolerance.  ABDOMEN:  No weight loss.Mild abdominal pain, no hematemesis or blood in stool.  URINARY: No flank pain, dysuria or hematuria.  PERIPHERAL VASCULAR: No claudication or cyanosis.  MUSCULOSKELETAL: Negative   NEUROLOGIC: No history of seizures, paralysis, alteration of gait or coordination.    PE Vital signs noted  Heent: Normocephalic,with no recent trauma,PERRLA,EOMI,conjunctiva clear with no exudate.Nasopharynx is not injected .Otic canal.TMs are not affected.Pharynx is slightly red.   Neck:Supple without adenopathy  Chest:Clear bilateral breath sounds normal  Heart:Regular rhthym without murmer  Abdomen:Soft, mild generalized tenderness,no rebound,no masses, no hepatosplenomegaly  Extremeties and Neurologic:Grossly within normal limits  Impression: Abd Pain   Wt loss  Plan:Lab eval  Abd US   If ftp GI con

## 2017-10-19 ENCOUNTER — TELEPHONE (OUTPATIENT)
Dept: RADIOLOGY | Facility: HOSPITAL | Age: 72
End: 2017-10-19

## 2017-10-20 ENCOUNTER — HOSPITAL ENCOUNTER (OUTPATIENT)
Dept: RADIOLOGY | Facility: HOSPITAL | Age: 72
Discharge: HOME | End: 2017-10-20
Attending: FAMILY MEDICINE
Payer: MEDICARE

## 2017-10-20 DIAGNOSIS — R63.4 WEIGHT LOSS: ICD-10-CM

## 2017-10-20 PROCEDURE — 76700 US EXAM ABDOM COMPLETE: CPT | Mod: TC,PO

## 2017-10-20 PROCEDURE — 76700 US EXAM ABDOM COMPLETE: CPT | Mod: 26,,, | Performed by: RADIOLOGY

## 2017-10-23 ENCOUNTER — TELEPHONE (OUTPATIENT)
Dept: FAMILY MEDICINE | Facility: CLINIC | Age: 72
End: 2017-10-23

## 2017-10-23 DIAGNOSIS — R63.4 WEIGHT LOSS: Primary | ICD-10-CM

## 2017-10-23 PROBLEM — G89.18 POST-OP PAIN: Status: RESOLVED | Noted: 2017-07-24 | Resolved: 2017-10-23

## 2017-10-26 ENCOUNTER — LAB VISIT (OUTPATIENT)
Dept: LAB | Facility: HOSPITAL | Age: 72
End: 2017-10-26
Attending: FAMILY MEDICINE
Payer: MEDICARE

## 2017-10-26 ENCOUNTER — OFFICE VISIT (OUTPATIENT)
Dept: GASTROENTEROLOGY | Facility: CLINIC | Age: 72
End: 2017-10-26
Payer: MEDICARE

## 2017-10-26 VITALS
SYSTOLIC BLOOD PRESSURE: 120 MMHG | HEIGHT: 65 IN | WEIGHT: 162.69 LBS | BODY MASS INDEX: 27.1 KG/M2 | HEART RATE: 71 BPM | DIASTOLIC BLOOD PRESSURE: 80 MMHG

## 2017-10-26 DIAGNOSIS — R63.4 WEIGHT LOSS: ICD-10-CM

## 2017-10-26 DIAGNOSIS — Z87.898 HISTORY OF ABDOMINAL PAIN: ICD-10-CM

## 2017-10-26 DIAGNOSIS — R19.7 DIARRHEA, UNSPECIFIED TYPE: ICD-10-CM

## 2017-10-26 DIAGNOSIS — D64.9 ANEMIA, UNSPECIFIED TYPE: ICD-10-CM

## 2017-10-26 DIAGNOSIS — R11.2 NON-INTRACTABLE VOMITING WITH NAUSEA, UNSPECIFIED VOMITING TYPE: ICD-10-CM

## 2017-10-26 DIAGNOSIS — R63.0 DECREASED APPETITE: ICD-10-CM

## 2017-10-26 DIAGNOSIS — Z79.01 ANTICOAGULANT LONG-TERM USE: ICD-10-CM

## 2017-10-26 DIAGNOSIS — R19.7 DIARRHEA, UNSPECIFIED TYPE: Primary | ICD-10-CM

## 2017-10-26 DIAGNOSIS — Z98.890 S/P KNEE SURGERY: ICD-10-CM

## 2017-10-26 LAB
ALBUMIN SERPL BCP-MCNC: 3.6 G/DL
ALP SERPL-CCNC: 65 U/L
ALT SERPL W/O P-5'-P-CCNC: 11 U/L
ANION GAP SERPL CALC-SCNC: 8 MMOL/L
AST SERPL-CCNC: 17 U/L
BASOPHILS # BLD AUTO: 0.02 K/UL
BASOPHILS NFR BLD: 0.4 %
BILIRUB SERPL-MCNC: 0.6 MG/DL
BUN SERPL-MCNC: 26 MG/DL
CALCIUM SERPL-MCNC: 9.6 MG/DL
CHLORIDE SERPL-SCNC: 104 MMOL/L
CO2 SERPL-SCNC: 29 MMOL/L
CREAT SERPL-MCNC: 0.9 MG/DL
DIFFERENTIAL METHOD: ABNORMAL
EOSINOPHIL # BLD AUTO: 0.1 K/UL
EOSINOPHIL NFR BLD: 2.5 %
ERYTHROCYTE [DISTWIDTH] IN BLOOD BY AUTOMATED COUNT: 15.9 %
EST. GFR  (AFRICAN AMERICAN): >60 ML/MIN/1.73 M^2
EST. GFR  (NON AFRICAN AMERICAN): >60 ML/MIN/1.73 M^2
GLUCOSE SERPL-MCNC: 112 MG/DL
HCT VFR BLD AUTO: 35 %
HGB BLD-MCNC: 11 G/DL
IGA SERPL-MCNC: 268 MG/DL
IMM GRANULOCYTES # BLD AUTO: 0 K/UL
IMM GRANULOCYTES NFR BLD AUTO: 0 %
IRON SERPL-MCNC: 61 UG/DL
LYMPHOCYTES # BLD AUTO: 3 K/UL
LYMPHOCYTES NFR BLD: 57.9 %
MCH RBC QN AUTO: 25.7 PG
MCHC RBC AUTO-ENTMCNC: 31.4 G/DL
MCV RBC AUTO: 82 FL
MONOCYTES # BLD AUTO: 0.4 K/UL
MONOCYTES NFR BLD: 7.6 %
NEUTROPHILS # BLD AUTO: 1.7 K/UL
NEUTROPHILS NFR BLD: 31.6 %
NRBC BLD-RTO: 0 /100 WBC
PLATELET # BLD AUTO: 336 K/UL
PMV BLD AUTO: 9.4 FL
POTASSIUM SERPL-SCNC: 3.7 MMOL/L
PROT SERPL-MCNC: 7.4 G/DL
RBC # BLD AUTO: 4.28 M/UL
SODIUM SERPL-SCNC: 141 MMOL/L
T4 SERPL-MCNC: 9.6 UG/DL
TSH SERPL DL<=0.005 MIU/L-ACNC: 0.57 UIU/ML
TSH SERPL DL<=0.005 MIU/L-ACNC: 0.57 UIU/ML
WBC # BLD AUTO: 5.25 K/UL

## 2017-10-26 PROCEDURE — 36415 COLL VENOUS BLD VENIPUNCTURE: CPT | Mod: PO

## 2017-10-26 PROCEDURE — 83516 IMMUNOASSAY NONANTIBODY: CPT

## 2017-10-26 PROCEDURE — 80053 COMPREHEN METABOLIC PANEL: CPT

## 2017-10-26 PROCEDURE — 82784 ASSAY IGA/IGD/IGG/IGM EACH: CPT

## 2017-10-26 PROCEDURE — 99999 PR PBB SHADOW E&M-EST. PATIENT-LVL III: CPT | Mod: PBBFAC,,, | Performed by: NURSE PRACTITIONER

## 2017-10-26 PROCEDURE — 99213 OFFICE O/P EST LOW 20 MIN: CPT | Mod: PBBFAC,PO | Performed by: NURSE PRACTITIONER

## 2017-10-26 PROCEDURE — 83540 ASSAY OF IRON: CPT

## 2017-10-26 PROCEDURE — 99214 OFFICE O/P EST MOD 30 MIN: CPT | Mod: S$PBB,,, | Performed by: NURSE PRACTITIONER

## 2017-10-26 PROCEDURE — 85025 COMPLETE CBC W/AUTO DIFF WBC: CPT

## 2017-10-26 PROCEDURE — 84443 ASSAY THYROID STIM HORMONE: CPT

## 2017-10-26 PROCEDURE — 84436 ASSAY OF TOTAL THYROXINE: CPT

## 2017-10-26 NOTE — PATIENT INSTRUCTIONS
Uncertain Causes of Diarrhea (Adult)    Diarrhea is when stools are loose and watery. This can be caused by:  · Viral infections  · Bacterial infections  · Food poisoning  · Parasites  · Irritable bowel syndrome (IBS)  · Inflammatory bowel diseases such as ulcerative colitis, Crohn's disease, and celiac disease  · Food intolerance, such as to lactose, the sugar found in milk and milk products  · Reaction to medicines like antibiotics, laxatives, cancer drugs, and antacids  Along with diarrhea, you may also have:  · Abdominal pain and cramping  · Nausea and vomiting  · Loss of bowel control  · Fever and chills  · Bloody stools  In some cases, antibiotics may help to treat diarrhea. You may have a stool sample test. This is done to see what is causing your diarrhea, and if antibiotics will help treat it. The results of a stool sample test may take up to 2 days. The healthcare provider may not give you antibiotics until he or she has the stool test results.  Diarrhea can cause dehydration. This is the loss of too much water and other fluids from the body. When this occurs, body fluid must be replaced. This can be done with oral rehydration solutions. Oral rehydration solutions are available at drugstores and grocery stores without a prescription.  Home care  Follow all instructions given by your healthcare provider. Rest at home for the next 24 hours, or until you feel better. Avoid caffeine, tobacco, and alcohol. These can make diarrhea, cramping, and pain worse.  If taking medicines:  · Dont take over-the-counter diarrhea or nausea medicines unless your healthcare provider tells you to.  · You may use acetaminophen or NSAID medicines like ibuprofen or naproxen to reduce pain and fever. Dont use these if you have chronic liver or kidney disease, or ever had a stomach ulcer or gastrointestinal bleeding. Don't use NSAID medicines if you are already taking one for another condition (like arthritis) or are on daily  aspirin therapy (such as for heart disease or after a stroke). Talk with your healthcare provider first.  · If antibiotics were prescribed, be sure you take them until they are finished. Dont stop taking them even when you feel better. Antibiotics must be taken as a full course.  To prevent the spread of illness:  · Remember that washing with soap and water and using alcohol-based  is the best way to prevent the spread of infection.  · Clean the toilet after each use.  · Wash your hands before eating.  · Wash your hands before and after preparing food. Keep in mind that people with diarrhea or vomiting should not prepare food for others.  · Wash your hands after using cutting boards, countertops, and knives that have been in contact with raw foods.  · Wash and then peel fruits and vegetables.  · Keep uncooked meats away from cooked and ready-to-eat foods.  · Use a food thermometer when cooking. Cook poultry to at least 165°F (74°C). Cook ground meat (beef, veal, pork, lamb) to at least 160°F (71°C). Cook fresh beef, veal, lamb, and pork to at least 145°F (63°C).  · Dont eat raw or undercooked eggs (poached or afsaneh side up), poultry, meat, or unpasteurized milk and juices.  Food and drinks  The main goal while treating vomiting or diarrhea is to prevent dehydration. This is done by taking small amounts of liquids often.  · Keep in mind that liquids are more important than food right now.  · Drink only small amounts of liquids at a time.  · Dont force yourself to eat, especially if you are having cramping, vomiting, or diarrhea. Dont eat large amounts at a time, even if you are hungry.  · If you eat, avoid fatty, greasy, spicy, or fried foods.  · Dont eat dairy foods or drink milk if you have diarrhea. These can make diarrhea worse.  During the first 24 hours you can try:  · Oral rehydration solutions. Do not use sports drinks. They have too much sugar and not enough electrolytes.  · Soft drinks without  caffeine  · Ginger ale  · Water (plain or flavored)  · Decaf tea or coffee  · Clear broth, consommé, or bouillon  · Gelatin, popsicles, or frozen fruit juice bars  The second 24 hours, if you are feeling better, you can add:  · Hot cereal, plain toast, bread, rolls, or crackers  · Plain noodles, rice, mashed potatoes, chicken noodle soup, or rice soup  · Unsweetened canned fruit (no pineapple)  · Bananas  As you recover:  · Limit fat intake to less than 15 grams per day. Dont eat margarine, butter, oils, mayonnaise, sauces, gravies, fried foods, peanut butter, meat, poultry, or fish.  · Limit fiber. Dont eat raw or cooked vegetables, fresh fruits except bananas, or bran cereals.  · Limit caffeine and chocolate.  · Limit dairy.  · Dont use spices or seasonings except salt.  · Go back to your normal diet over time, as you feel better and your symptoms improve.  · If the symptoms come back, go back to a simple diet or clear liquids.  Follow-up care  Follow up with your healthcare provider, or as advised. If a stool sample was taken or cultures were done, call the healthcare provider for the results as instructed.  Call 911  Call 911 if you have any of these symptoms:  · Trouble breathing  · Confusion  · Extreme drowsiness or trouble walking  · Loss of consciousness  · Rapid heart rate  · Chest pain  · Stiff neck  · Seizure  When to seek medical advice  Call your healthcare provider right away if any of these occur:  · Abdominal pain that gets worse  · Constant lower right abdominal pain  · Continued vomiting and inability to keep liquids down  · Diarrhea more than 5 times a day  · Blood in vomit or stool  · Dark urine or no urine for 8 hours, dry mouth and tongue, tiredness, weakness, or dizziness  · Drowsiness  · New rash  · You dont get better in 2 to 3 days  · Fever of 100.4°F (38°C) or higher that doesnt get lower with medicine  Date Last Reviewed: 1/3/2016  © 8285-2996 [employer]. [address] [place] [address]  [place] [address]. All rights reserved. This information is not intended as a substitute for professional medical care. Always follow your healthcare professional's instructions.        Abdominal Pain    Abdominal pain is pain in the stomach or belly area. Everyone has this pain from time to time. In many cases it goes away on its own. But abdominal pain can sometimes be due to a serious problem, such as appendicitis. So its important to know when to seek help.  Causes of abdominal pain  There are many possible causes of abdominal pain. Common causes in adults include:  · Constipation, diarrhea, or gas  · Stomach acid flowing back up into the esophagus (acid reflux or heartburn)  · Severe acid reflux, called GERD (gastroesophageal reflux disease)  · A sore in the lining of the stomach or small intestine (peptic ulcer)  · Inflammation of the gallbladder, liver, or pancreas  · Gallstones or kidney stones  · Appendicitis   · Intestinal blockage   · An internal organ pushing through a muscle or other tissue (hernia)  · Urinary tract infections  · In women, menstrual cramps, fibroids, or endometriosis  · Inflammation or infection of the intestines  Diagnosing the cause of abdominal pain  Your healthcare provider will do a physical exam help find the cause of your pain. If needed, tests will be ordered. Belly pain has many possible causes. So it can be hard to find the reason for your pain. Giving details about your pain can help. Tell your provider where and when you feel the pain, and what makes it better or worse. Also let your provider know if you have other symptoms such as:  · Fever  · Tiredness  · Upset stomach (nausea)  · Vomiting  · Changes in bathroom habits  Treating abdominal pain  Some causes of pain need emergency medical treatment right away. These include appendicitis or a bowel blockage. Other problems can be treated with rest, fluids, or medicines. Your healthcare provider can give you  specific instructions for treatment or self-care based on what is causing your pain.  If you have vomiting or diarrhea, sip water or other clear fluids. When you are ready to eat solid foods again, start with small amounts of easy-to-digest, low-fat foods. These include apple sauce, toast, or crackers.   When to seek medical care  Call 911 or go to the hospital right away if you:  · Cant pass stool and are vomiting  · Are vomiting blood or have bloody diarrhea or black, tarry diarrhea  · Have chest, neck, or shoulder pain  · Feel like you might pass out  · Have pain in your shoulder blades with nausea  · Have sudden, severe belly pain  · Have new, severe pain unlike any you have felt before  · Have a belly that is rigid, hard, and tender to touch  Call your healthcare provider if you have:  · Pain for more than 5 days  · Bloating for more than 2 days  · Diarrhea for more than 5 days  · A fever of 100.4°F (38.0°C) or higher, or as directed by your provider  · Pain that gets worse  · Weight loss for no reason  · Continued lack of appetite  · Blood in your stool  How to prevent abdominal pain  Here are some tips to help prevent abdominal pain:  · Eat smaller amounts of food at one time.  · Avoid greasy, fried, or other high-fat foods.  · Avoid foods that give you gas.  · Exercise regularly.  · Drink plenty of fluids.  To help prevent GERD symptoms:  · Quit smoking.  · Reduce alcohol and certain foods that increase stomach acid.  · Avoid aspirin and over-the-counter pain and fever medicines (NSAIDS or nonsteroidal anti-inflammatory drugs), if possible  · Lose extra weight.  · Finish eating at least 2 hours before you go to bed or lie down.  · Raise the head of your bed.  Date Last Reviewed: 7/1/2016  © 3980-9807 [employer]. [address] [place] [address], [place] [address]. All rights reserved. This information is not intended as a substitute for professional medical care. Always follow your healthcare  professional's instructions.

## 2017-10-26 NOTE — PROGRESS NOTES
Subjective:       Patient ID: Cheryl Quiroga is a 72 y.o. female Body mass index is 27.07 kg/m².    Chief Complaint: Diarrhea ( and n/v s/p knee surgery in August)    This patient is new to me.  Referring Provider:  Dr. Morris for weight loss.  Established patient of Dr. Pimentel.    GI Problem   The primary symptoms include weight loss (lost about 21 lbs since August 2017, denies trying to lose weight), fatigue, abdominal pain (lower abdomen, had with vomiting; has resolved), nausea, vomiting (has when symptoms first started, emesis was a few times a day of green bile; denies any bright red blood or coffee ground emesis; was worse after eating; has resolved, last occurred 3 weeks ago) and diarrhea (when first started has it- lasted for 1 week; then intermittent, has resolved; was worse with green vegetables). Primary symptoms do not include fever, melena, hematemesis, hematochezia or dysuria. The illness began more than 7 days ago (everything started after knee surgery 8/2017). The problem has been rapidly improving.   The illness does not include chills, dysphagia, odynophagia, bloating or constipation. Associated symptoms comments: Recent hospitalization for knee surgery; Denies recent antibiotics, foreign travel, ill contacts, or suspect food intake; been on metformin for years. Significant associated medical issues include diverticulitis. Associated medical issues do not include inflammatory bowel disease, GERD or PUD.     Review of Systems   Constitutional: Positive for appetite change (decreased; eating 2-3 small meals/snacks a day), fatigue and weight loss (lost about 21 lbs since August 2017, denies trying to lose weight). Negative for chills and fever.   HENT: Negative for sore throat and trouble swallowing.    Respiratory: Negative for cough, choking and shortness of breath.    Cardiovascular: Negative for chest pain.   Gastrointestinal: Positive for abdominal pain (lower abdomen, had with vomiting; has  resolved), diarrhea (when first started has it- lasted for 1 week; then intermittent, has resolved; was worse with green vegetables), nausea and vomiting (has when symptoms first started, emesis was a few times a day of green bile; denies any bright red blood or coffee ground emesis; was worse after eating; has resolved, last occurred 3 weeks ago). Negative for anal bleeding, bloating, blood in stool, constipation, dysphagia, hematemesis, hematochezia, melena and rectal pain.   Genitourinary: Negative for difficulty urinating, dysuria and flank pain.   Neurological: Negative for weakness.       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 Pimentel, repeat in 5 years    HYSTERECTOMY      ovaries removed    JOINT REPLACEMENT      right knee    KNEE SURGERY Left 08/2017    UPPER GASTROINTESTINAL ENDOSCOPY  08/17/2015    Dr. Pimentel     Family History   Problem Relation Age of Onset    Heart disease Mother     Hypertension Father     Diabetes Father     Colon polyps Father     Colon cancer Neg Hx     Stomach cancer Neg Hx     Crohn's disease Neg Hx     Ulcerative colitis Neg Hx     Celiac disease Neg Hx      Wt Readings from Last 10 Encounters:   10/26/17 73.8 kg (162 lb 11.2 oz)   10/17/17 75.3 kg (166 lb)   09/18/17 77.1 kg (170 lb)   09/13/17 76.8 kg (169 lb 6.4 oz)   08/30/17 80.2 kg (176 lb 12.8 oz)   08/22/17 82 kg (180 lb 12.4 oz)   08/14/17 83 kg (182 lb 15.7  oz)   08/10/17 82 kg (180 lb 12.4 oz)   07/24/17 83.6 kg (184 lb 6.4 oz)   07/05/17 84.7 kg (186 lb 11.7 oz)     Lab Results   Component Value Date    WBC 6.43 08/10/2017    HGB 9.2 (L) 08/15/2017    HCT 29.4 (L) 08/15/2017    MCV 85 08/10/2017     08/10/2017     CMP  Sodium   Date Value Ref Range Status   08/15/2017 137 136 - 145 mmol/L Final     Potassium   Date Value Ref Range Status   08/15/2017 3.8 3.5 - 5.1 mmol/L Final     Chloride   Date Value Ref Range Status   08/15/2017 105 95 - 110 mmol/L Final     CO2   Date Value Ref Range Status   08/15/2017 26 22 - 31 mmol/L Final     Glucose   Date Value Ref Range Status   08/15/2017 108 70 - 110 mg/dL Final     Comment:     The ADA recommends the following guidelines for fasting glucose:  Normal:       less than 100 mg/dL  Prediabetes:  100 mg/dL to 125 mg/dL  Diabetes:     126 mg/dL or higher       BUN, Bld   Date Value Ref Range Status   08/15/2017 18 7 - 18 mg/dL Final     Creatinine   Date Value Ref Range Status   08/15/2017 0.67 0.50 - 1.40 mg/dL Final     Calcium   Date Value Ref Range Status   08/15/2017 8.5 8.4 - 10.2 mg/dL Final     Total Protein   Date Value Ref Range Status   01/26/2017 6.7 6.0 - 8.4 g/dL Final     Albumin   Date Value Ref Range Status   01/26/2017 3.2 (L) 3.5 - 5.2 g/dL Final     Total Bilirubin   Date Value Ref Range Status   01/26/2017 0.6 0.1 - 1.0 mg/dL Final     Comment:     For infants and newborns, interpretation of results should be based  on gestational age, weight and in agreement with clinical  observations.  Premature Infant recommended reference ranges:  Up to 24 hours.............<8.0 mg/dL  Up to 48 hours............<12.0 mg/dL  3-5 days..................<15.0 mg/dL  6-29 days.................<15.0 mg/dL       Alkaline Phosphatase   Date Value Ref Range Status   01/26/2017 74 55 - 135 U/L Final     AST   Date Value Ref Range Status   01/26/2017 16 10 - 40 U/L Final     ALT   Date Value Ref Range Status   01/26/2017  "9 (L) 10 - 44 U/L Final     Anion Gap   Date Value Ref Range Status   08/15/2017 6 (L) 8 - 16 mmol/L Final     eGFR if    Date Value Ref Range Status   08/15/2017 >60 >60 mL/min/1.73 m^2 Final     eGFR if non    Date Value Ref Range Status   08/15/2017 >60 >60 mL/min/1.73 m^2 Final     Comment:     Calculation used to obtain the estimated glomerular filtration  rate (eGFR) is the CKD-EPI equation. Since race is unknown   in our information system, the eGFR values for   -American and Non--American patients are given   for each creatinine result.       Lab Results   Component Value Date    TSH 0.908 01/26/2017     Reviewed prior medical records including radiology report of 10/20/17 abdominal ultrasound & endoscopy history (see surgical history).    8/17/15 Colonoscopy was reviewed and procedure report states:   " Impression:           - The examined portion of the ileum was normal.                        - One 8 mm polyp at the hepatic flexure. Resected                         and retrieved.                        - Moderate diverticulosis in the sigmoid colon and                         in the descending colon. There was no evidence of                         diverticular bleeding.                        - The examination was otherwise normal on direct                         and retroflexion views.  Recommendation:       - The patient will be observed post-procedure,                         until all discharge criteria are met.                        - Discharge patient to home (ambulatory).                        - Patient has a contact number available for                         emergencies. The signs and symptoms of potential                         delayed complications were discussed with the                         patient. Return to normal activities tomorrow.                         Written discharge instructions were provided to the                        " " patient.                        - Resume previous diet today.                        - Continue present medications.                        - Await pathology results.                        - Repeat colonoscopy in 5 years for adenoma                         surveillance.                        - Return to referring physician as previously                         scheduled. ".  Biopsy results:   "3. Colon, biopsy:  Tubular adenoma."    8/17/15 EGD was reviewed and procedure report states:   " Impression:           - Z-line regular, 40 cm from the incisors.                        - Normal esophagus.                        - Normal stomach.                        - Normal examined duodenum.                        - Multiple biopsies were obtained in the gastric                         antrum at 2nd part of the duodenum.  Recommendation:       - The patient will be observed post-procedure,                         until all discharge criteria are met.                        - Discharge patient to home (ambulatory).                        - Patient has a contact number available for                         emergencies. The signs and symptoms of potential                         delayed complications were discussed with the                         patient. Return to normal activities tomorrow.                         Written discharge instructions were provided to the                         patient.                        - Resume previous diet today.                        - Continue present medications.                        - Await pathology results.                        - Follow an antireflux regimen daily.".  Biopsy results:   "1. Small bowel, biopsy:  Normal small bowel mucosa.  Negative for significant inflammation and villous blunting.  2. Stomach, antrum, biopsy:  Marked chronic gastritis.  Immunohistochemical staining for Helicobacter is negative (satisfactory control material)."  Objective:      Physical " Exam   Constitutional: She is oriented to person, place, and time. She appears well-developed and well-nourished. No distress.   HENT:   Mouth/Throat: Oropharynx is clear and moist and mucous membranes are normal. No oral lesions. No oropharyngeal exudate.   Eyes: Conjunctivae are normal. Pupils are equal, round, and reactive to light. No scleral icterus.   Cardiovascular: Normal rate.    Pulmonary/Chest: Effort normal and breath sounds normal. No respiratory distress. She has no wheezes.   Abdominal: Soft. Normal appearance and bowel sounds are normal. She exhibits no distension, no abdominal bruit and no mass. There is no hepatosplenomegaly. There is no tenderness. There is no rigidity, no rebound, no guarding, no tenderness at McBurney's point and negative Cruz's sign. No hernia.   Neurological: She is alert and oriented to person, place, and time.   Skin: Skin is warm and dry. No rash noted. She is not diaphoretic. No erythema. No pallor.   Non-jaundiced   Psychiatric: She has a normal mood and affect. Her behavior is normal. Judgment and thought content normal.   Nursing note and vitals reviewed.      Assessment:       1. Diarrhea, unspecified type    2. History of abdominal pain    3. Non-intractable vomiting with nausea, unspecified vomiting type    4. S/P knee surgery    5. Weight loss    6. Decreased appetite    7. Anticoagulant long-term use        Plan:       Diarrhea, unspecified type  -     TSH; Future; Expected date: 10/26/2017  -     IgA; Future; Expected date: 10/26/2017  -     Tissue transglutaminase, IgA; Future; Expected date: 10/26/2017  -     Adenovirus Antigen EIA, Stool; Future; Expected date: 10/26/2017  -     Giardia / Cryptosporidum, EIA; Future; Expected date: 10/26/2017  -     Occult blood x 1, stool; Future; Expected date: 10/26/2017  -     Rotavirus antigen, stool; Future; Expected date: 10/26/2017  -     Stool Exam-Ova,Cysts,Parasites; Future; Expected date: 10/26/2017  -     WBC,  Stool; Future; Expected date: 11/09/2017  -     Stool culture; Future; Expected date: 11/09/2017  -     Clostridium difficile EIA; Future; Expected date: 10/26/2017  - recommended OTC probiotic, such as Align or Culturelle, as directed  - avoid lactose  - if diarrhea recurs, recommend to complete above stool studies    History of Abdominal pain, & Non-intractable vomiting with nausea, unspecified vomiting type  - schedule EGD, discussed procedure with patient, patient verbalized understanding  - abdominal pain and vomiting have resolved; if they recur, recommend follow-up for continued evaluation and management  - possible ct scan if symptoms recur/persist and pending results of testing    S/P knee surgery  Recommend follow-up with surgeon for continued evaluation and management.    Weight loss & Decreased appetite  -     TSH; Future; Expected date: 10/26/2017  -     IgA; Future; Expected date: 10/26/2017  -     Tissue transglutaminase, IgA; Future; Expected date: 10/26/2017  - encouraged PO intake and daily calorie counts to ensure adequate nutrition is taken in, recommend at least 1,800-2,000 calories a day  - recommend nutritional drinks, such as Glucerna, to supplement nutrition needs  - schedule EGD, discussed procedure with patient, patient verbalized understanding  - possible ct scan if symptoms persist and pending results of testing    Anticoagulant long-term use  - patient is on blood-thinner(s)=aspirin, informed patient that it will likely need to be held for endoscopy, nurse will confirm with cardiologist/PCP.    Return in about 1 month (around 11/26/2017), or if symptoms worsen or fail to improve.      If no improvement in symptoms or symptoms worsen, call/follow-up at clinic or go to ER.

## 2017-10-26 NOTE — LETTER
October 30, 2017      Augie Morris MD  [address] [facility] [address]           [facility]  [address] [facility] [address] [place] [address]  Phone: [phone]          Patient: Cheryl Quiroga   MR Number: [id number]   YOB: 1945   Date of Visit: 10/26/2017       Dear Dr. Augie Morris:    Thank you for referring Cheryl Quiroga to me for evaluation. Attached you will find relevant portions of my assessment and plan of care.    If you have questions, please do not hesitate to call me. I look forward to following Cheryl Quiroga along with you.    Sincerely,    Alena Valdes, [facility]    Enclosure  CC:  No Recipients    If you would like to receive this communication electronically, please contact [email or web address] or [phone] to request more information on [employer] Link access.    For providers and/or their staff who would like to refer a patient to [facility], please contact us through our one-stop-shop provider referral line, [facility], at [phone].    If you feel you have received this communication in error or would no longer like to receive these types of communications, please e-mail [email or web address]

## 2017-10-30 LAB — TTG IGA SER IA-ACNC: 6 UNITS

## 2017-10-31 DIAGNOSIS — M25.562 LEFT KNEE PAIN, UNSPECIFIED CHRONICITY: Primary | ICD-10-CM

## 2017-11-01 ENCOUNTER — OFFICE VISIT (OUTPATIENT)
Dept: ORTHOPEDICS | Facility: CLINIC | Age: 72
End: 2017-11-01
Payer: MEDICARE

## 2017-11-01 ENCOUNTER — HOSPITAL ENCOUNTER (OUTPATIENT)
Dept: RADIOLOGY | Facility: HOSPITAL | Age: 72
Discharge: HOME | End: 2017-11-01
Attending: ORTHOPAEDIC SURGERY
Payer: MEDICARE

## 2017-11-01 VITALS
DIASTOLIC BLOOD PRESSURE: 83 MMHG | SYSTOLIC BLOOD PRESSURE: 135 MMHG | HEIGHT: 65 IN | BODY MASS INDEX: 27.1 KG/M2 | WEIGHT: 162.69 LBS | HEART RATE: 71 BPM

## 2017-11-01 DIAGNOSIS — Z96.651 STATUS POST TOTAL RIGHT KNEE REPLACEMENT USING CEMENT: Primary | ICD-10-CM

## 2017-11-01 DIAGNOSIS — M25.562 LEFT KNEE PAIN, UNSPECIFIED CHRONICITY: ICD-10-CM

## 2017-11-01 DIAGNOSIS — G89.18 POST-OP PAIN: ICD-10-CM

## 2017-11-01 PROCEDURE — 99024 POSTOP FOLLOW-UP VISIT: CPT | Mod: ,,, | Performed by: ORTHOPAEDIC SURGERY

## 2017-11-01 PROCEDURE — 99999 PR PBB SHADOW E&M-EST. PATIENT-LVL III: CPT | Mod: PBBFAC,,, | Performed by: ORTHOPAEDIC SURGERY

## 2017-11-01 PROCEDURE — 73564 X-RAY EXAM KNEE 4 OR MORE: CPT | Mod: 26,LT,, | Performed by: RADIOLOGY

## 2017-11-01 PROCEDURE — 73564 X-RAY EXAM KNEE 4 OR MORE: CPT | Mod: TC,PO,LT

## 2017-11-01 PROCEDURE — 73562 X-RAY EXAM OF KNEE 3: CPT | Mod: 26,59,RT, | Performed by: RADIOLOGY

## 2017-11-01 PROCEDURE — 99213 OFFICE O/P EST LOW 20 MIN: CPT | Mod: PBBFAC,25,PO | Performed by: ORTHOPAEDIC SURGERY

## 2017-11-01 NOTE — PROGRESS NOTES
"Subjective:          Chief Complaint: Cheryl Quiroga is a 72 y.o. female who had concerns including Pain of the Left Knee.    Mrs. Quiroga is here today for f/u after her left knee surgery. She is doing well today. Pain 0/10. She just returned to PT after being ill. She has no complaints.    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     PROCEDURES PERFORMED: Replacement, Knee, Medial and Lateral compartment (Total Knee) 61575 and femoral autologous bone grafting     ANESTHESIA: Local, Regional w/ catheter  and spinal/epidural     FLUIDS IN THE CASE: 600 ml     ESTIMATED BLOOD LOSS: 100ml     URINE OUTPUT: N/A     COMPLICATIONS: none     CONDITION ON RETURN TO RECOVERY ROOM: Good     IMPLANTS UTILIZED:  left   Depuy total knee  Depuy ATTUNE femoral 5  left  Depuy ATTUNE tibial 5     Depuy fixed insert size 5", 5 mm thick,  ATTUNE 35 mm medialized patellar component; SmartSet with gentamicin was utilized on tibial and patellar components.         Pain   Associated symptoms include nausea and vomiting.       Review of Systems   Musculoskeletal: Positive for joint pain, muscle weakness and stiffness.   Gastrointestinal: Positive for diarrhea, nausea and vomiting.                   Objective:        General: Cheryl is well-developed, well-nourished, appears stated age, in no acute distress, alert and oriented to time, place and person.     General    Vitals reviewed.  Constitutional: She is oriented to person, place, and time. She appears well-developed and well-nourished. No distress.   HENT:   Head: Normocephalic and atraumatic.   Nose: Nose normal.   Eyes: Pupils are equal, round, and reactive to " light.   Cardiovascular: Normal rate.    Pulmonary/Chest: Effort normal.   Neurological: She is alert and oriented to person, place, and time.   Psychiatric: She has a normal mood and affect. Her behavior is normal. Judgment and thought content normal.     General Musculoskeletal Exam   Gait: normal       Right Knee Exam     Inspection   Scars: present    Range of Motion   Extension: 0   Flexion: 110     Left Knee Exam     Inspection   Erythema: absent  Scars: present  Swelling: absent  Effusion: absent  Deformity: deformity  Bruising: absent    Tenderness   The patient tender to palpation of the tibial tubercle.    Range of Motion   Extension:  0 normal   Flexion:  130 abnormal     Tests   Meniscus   Lindsey:  Medial - negative Lateral - negative  Patella   Patellar Apprehension: negative  Passive Patellar Tilt: neutral  Patellar Tracking: normal  Patellar Glide (Quadrants): Lateral - 1 Medial - 1  Patellar Grind: negative    Other   Sensation: decreased    Comments:  Incisions are healed with some hypertrophy    Muscle Strength   Left Lower Extremity   Hip Abduction: 5/5   Quadriceps:  5/5   Hamstrin/5     Vascular Exam       Left Pulses  Dorsalis Pedis:      2+  Posterior Tibial:      2+          Previous radiographic studies and results were reviewed with the patient:   Findings: Postoperative changes related to prior left total knee arthroplasty again demonstrated.  Hardware appears unchanged without definite evidence of failure or loosening.  No acute fractures or dislocations visualized.  Anterior soft tissue edema and probable joint effusion appear similar to prior. Right knee prosthesis appears grossly unchanged.      Assessment:       Encounter Diagnoses   Name Primary?    Post-op pain     Status post total right knee replacement using cement Yes          Plan:         Return to therapy  Continue with pain control PRN  F/U in 6 weeks

## 2017-11-02 ENCOUNTER — LAB VISIT (OUTPATIENT)
Dept: LAB | Facility: HOSPITAL | Age: 72
End: 2017-11-02
Attending: NURSE PRACTITIONER
Payer: MEDICARE

## 2017-11-02 DIAGNOSIS — R19.7 DIARRHEA, UNSPECIFIED TYPE: ICD-10-CM

## 2017-11-02 LAB
C DIFF GDH STL QL: NEGATIVE
C DIFF TOX A+B STL QL IA: NEGATIVE
WBC #/AREA STL HPF: NORMAL /[HPF]

## 2017-11-02 PROCEDURE — 82272 OCCULT BLD FECES 1-3 TESTS: CPT

## 2017-11-02 PROCEDURE — 87427 SHIGA-LIKE TOXIN AG IA: CPT

## 2017-11-02 PROCEDURE — 89055 LEUKOCYTE ASSESSMENT FECAL: CPT

## 2017-11-02 PROCEDURE — 87209 SMEAR COMPLEX STAIN: CPT

## 2017-11-02 PROCEDURE — 87449 NOS EACH ORGANISM AG IA: CPT

## 2017-11-02 PROCEDURE — 87046 STOOL CULTR AEROBIC BACT EA: CPT | Mod: 59

## 2017-11-02 PROCEDURE — 87045 FECES CULTURE AEROBIC BACT: CPT

## 2017-11-02 PROCEDURE — 87301 ADENOVIRUS AG IA: CPT

## 2017-11-02 PROCEDURE — 87329 GIARDIA AG IA: CPT

## 2017-11-02 PROCEDURE — 87425 ROTAVIRUS AG IA: CPT

## 2017-11-03 ENCOUNTER — TELEPHONE (OUTPATIENT)
Dept: FAMILY MEDICINE | Facility: CLINIC | Age: 72
End: 2017-11-03

## 2017-11-03 LAB
CRYPTOSP AG STL QL IA: NEGATIVE
E COLI SXT1 STL QL IA: NEGATIVE
E COLI SXT2 STL QL IA: NEGATIVE
G LAMBLIA AG STL QL IA: NEGATIVE
O+P STL TRI STN: NORMAL
OB PNL STL: NEGATIVE
RV AG STL QL IA.RAPID: NEGATIVE

## 2017-11-03 NOTE — TELEPHONE ENCOUNTER
----- Message from Ирина Magdaleno sent at 11/3/2017 11:51 AM CDT -----  Contact: pt   Call pt regarding getting testing supplies from mail order company. Pt states that she will give all the info to the nurse when she call her back. Pt cant get testing supplies until the doctor let the company know.    ..[phone] (home)

## 2017-11-06 LAB
BACTERIA STL CULT: NORMAL
HADV AG STL QL IA: NOT DETECTED

## 2017-11-07 NOTE — TELEPHONE ENCOUNTER
Spoke to pt, states she is requesting them through [employer]. Pt notified that papers will be sent back today.

## 2017-12-11 ENCOUNTER — PATIENT OUTREACH (OUTPATIENT)
Dept: ADMINISTRATIVE | Facility: HOSPITAL | Age: 72
End: 2017-12-11

## 2017-12-11 NOTE — PROGRESS NOTES
Spoke with pt concerning overdue eye exam.  Pt stated she has not had her eye exam and will schedule.

## 2017-12-12 ENCOUNTER — SURGERY (OUTPATIENT)
Age: 72
End: 2017-12-12

## 2017-12-12 ENCOUNTER — ANESTHESIA (OUTPATIENT)
Dept: ENDOSCOPY | Facility: HOSPITAL | Age: 72
End: 2017-12-12
Payer: MEDICARE

## 2017-12-12 ENCOUNTER — HOSPITAL ENCOUNTER (OUTPATIENT)
Facility: HOSPITAL | Age: 72
Discharge: HOME | End: 2017-12-12
Attending: INTERNAL MEDICINE | Admitting: INTERNAL MEDICINE
Payer: MEDICARE

## 2017-12-12 ENCOUNTER — ANESTHESIA EVENT (OUTPATIENT)
Dept: ENDOSCOPY | Facility: HOSPITAL | Age: 72
End: 2017-12-12
Payer: MEDICARE

## 2017-12-12 VITALS
TEMPERATURE: 98 F | DIASTOLIC BLOOD PRESSURE: 75 MMHG | BODY MASS INDEX: 27.99 KG/M2 | RESPIRATION RATE: 18 BRPM | SYSTOLIC BLOOD PRESSURE: 128 MMHG | OXYGEN SATURATION: 99 % | HEART RATE: 78 BPM | WEIGHT: 168 LBS | HEIGHT: 65 IN

## 2017-12-12 DIAGNOSIS — R10.9 ABDOMINAL PAIN: ICD-10-CM

## 2017-12-12 LAB
GLUCOSE SERPL-MCNC: 99 MG/DL (ref 70–110)
H PYLORI INDEX VALUE: POSITIVE

## 2017-12-12 PROCEDURE — 63600175 PHARM REV CODE 636 W HCPCS: Mod: PO | Performed by: NURSE ANESTHETIST, CERTIFIED REGISTERED

## 2017-12-12 PROCEDURE — 88342 IMHCHEM/IMCYTCHM 1ST ANTB: CPT | Mod: 26,,, | Performed by: PATHOLOGY

## 2017-12-12 PROCEDURE — 43239 EGD BIOPSY SINGLE/MULTIPLE: CPT | Mod: ,,, | Performed by: INTERNAL MEDICINE

## 2017-12-12 PROCEDURE — D9220A PRA ANESTHESIA: Mod: ANES,,, | Performed by: ANESTHESIOLOGY

## 2017-12-12 PROCEDURE — 37000008 HC ANESTHESIA 1ST 15 MINUTES: Mod: PO | Performed by: INTERNAL MEDICINE

## 2017-12-12 PROCEDURE — 82962 GLUCOSE BLOOD TEST: CPT | Mod: PO | Performed by: INTERNAL MEDICINE

## 2017-12-12 PROCEDURE — 37000009 HC ANESTHESIA EA ADD 15 MINS: Mod: PO | Performed by: INTERNAL MEDICINE

## 2017-12-12 PROCEDURE — 43239 EGD BIOPSY SINGLE/MULTIPLE: CPT | Mod: PO | Performed by: INTERNAL MEDICINE

## 2017-12-12 PROCEDURE — 87449 NOS EACH ORGANISM AG IA: CPT | Mod: PO | Performed by: INTERNAL MEDICINE

## 2017-12-12 PROCEDURE — D9220A PRA ANESTHESIA: Mod: CRNA,,, | Performed by: NURSE ANESTHETIST, CERTIFIED REGISTERED

## 2017-12-12 PROCEDURE — 88305 TISSUE EXAM BY PATHOLOGIST: CPT | Performed by: PATHOLOGY

## 2017-12-12 PROCEDURE — 25000003 PHARM REV CODE 250: Mod: PO | Performed by: ANESTHESIOLOGY

## 2017-12-12 PROCEDURE — 88305 TISSUE EXAM BY PATHOLOGIST: CPT | Mod: 26,,, | Performed by: PATHOLOGY

## 2017-12-12 PROCEDURE — 27201012 HC FORCEPS, HOT/COLD, DISP: Mod: PO | Performed by: INTERNAL MEDICINE

## 2017-12-12 RX ORDER — LIDOCAINE HYDROCHLORIDE 10 MG/ML
1 INJECTION INFILTRATION; PERINEURAL ONCE
Status: COMPLETED | OUTPATIENT
Start: 2017-12-12 | End: 2017-12-12

## 2017-12-12 RX ORDER — PROPOFOL 10 MG/ML
VIAL (ML) INTRAVENOUS
Status: DISCONTINUED | OUTPATIENT
Start: 2017-12-12 | End: 2017-12-12

## 2017-12-12 RX ORDER — SODIUM CHLORIDE, SODIUM LACTATE, POTASSIUM CHLORIDE, CALCIUM CHLORIDE 600; 310; 30; 20 MG/100ML; MG/100ML; MG/100ML; MG/100ML
INJECTION, SOLUTION INTRAVENOUS CONTINUOUS
Status: DISCONTINUED | OUTPATIENT
Start: 2017-12-13 | End: 2017-12-12 | Stop reason: HOSPADM

## 2017-12-12 RX ORDER — LIDOCAINE HCL/PF 100 MG/5ML
SYRINGE (ML) INTRAVENOUS
Status: DISCONTINUED | OUTPATIENT
Start: 2017-12-12 | End: 2017-12-12

## 2017-12-12 RX ADMIN — PROPOFOL 30 MG: 10 INJECTION, EMULSION INTRAVENOUS at 08:12

## 2017-12-12 RX ADMIN — SODIUM CHLORIDE, SODIUM LACTATE, POTASSIUM CHLORIDE, CALCIUM CHLORIDE: 600; 310; 30; 20 INJECTION, SOLUTION INTRAVENOUS at 07:12

## 2017-12-12 RX ADMIN — LIDOCAINE HYDROCHLORIDE 1 ML: 10 INJECTION, SOLUTION EPIDURAL; INFILTRATION; INTRACAUDAL; PERINEURAL at 07:12

## 2017-12-12 RX ADMIN — LIDOCAINE HYDROCHLORIDE 100 MG: 20 INJECTION, SOLUTION INTRAVENOUS at 08:12

## 2017-12-12 NOTE — DISCHARGE INSTRUCTIONS
Recovery After Procedural Sedation (Adult)  You have been given medicine by vein to make you sleep during your surgery. This may have included both a pain medicine and sleeping medicine. Most of the effects have worn off. But you may still have some drowsiness for the next 6 to 8 hours.  Home care  Follow these guidelines when you get home:  · For the next 8 hours, you should be watched by a responsible adult. This person should make sure your condition is not getting worse.  · Don't drink any alcohol for the next 24 hours.  · Don't drive, operate dangerous machinery, or make important business or personal decisions during the next 24 hours.  Note: Your healthcare provider may tell you not to take any medicine by mouth for pain or sleep in the next 4 hours. These medicines may react with the medicines you were given in the hospital. This could cause a much stronger response than usual.  Follow-up care  Follow up with your healthcare provider if you are not alert and back to your usual level of activity within 12 hours.  When to seek medical advice  Call your healthcare provider right away if any of these occur:  · Drowsiness gets worse  · Weakness or dizziness gets worse  · Repeated vomiting  · You can't be awakened   Date Last Reviewed: 10/18/2016  © 4090-6996 [employer]. [address] [place] [address], [place] [address]. All rights reserved. This information is not intended as a substitute for professional medical care. Always follow your healthcare professional's instructions.        Tips to Control Acid Reflux    To control acid reflux, youll need to make some basic diet and lifestyle changes. The simple steps outlined below may be all youll need to ease discomfort.  Watch what you eat  · Avoid fatty foods and spicy foods.  · Eat fewer acidic foods, such as citrus and tomato-based foods. These can increase symptoms.  · Limit drinking alcohol, caffeine, and fizzy beverages. All increase acid  reflux.  · Try limiting chocolate, peppermint, and spearmint. These can worsen acid reflux in some people.  Watch when you eat  · Avoid lying down for 3 hours after eating.  · Do not snack before going to bed.  Raise your head  Raising your head and upper body by 4 to 6 inches helps limit reflux when youre lying down. Put blocks under the head of your bed frame to raise it.  Other changes  · Lose weight, if you need to  · Dont exercise near bedtime  · Avoid tight-fitting clothes  · Limit aspirin and ibuprofen  · Stop smoking   Date Last Reviewed: 7/1/2016  © 9684-0604 [employer]. [address] [place] [address], [place] [address]. All rights reserved. This information is not intended as a substitute for professional medical care. Always follow your healthcare professional's instructions.

## 2017-12-12 NOTE — TRANSFER OF CARE
"Anesthesia Transfer of Care Note    Patient: Cheryl Quiroga    Procedure(s) Performed: Procedure(s) (LRB):  ESOPHAGOGASTRODUODENOSCOPY (EGD) (N/A)    Patient location: PACU    Anesthesia Type: general    Transport from OR: Transported from OR on room air with adequate spontaneous ventilation    Post pain: adequate analgesia    Post assessment: no apparent anesthetic complications and tolerated procedure well    Post vital signs: stable    Level of consciousness: awake and alert    Nausea/Vomiting: no nausea/vomiting    Complications: none    Transfer of care protocol was followed      Last vitals:   Visit Vitals  BP (!) 159/72 (BP Location: Right arm, Patient Position: Lying)   Pulse (!) 59   Temp 36.3 °C (97.3 °F) (Skin)   Resp 18   Ht 5' 5" (1.651 m)   Wt 76.2 kg (168 lb)   SpO2 98%   Breastfeeding? No   BMI 27.96 kg/m²     "

## 2017-12-12 NOTE — BRIEF OP NOTE
Discharge Note  Short Stay      SUMMARY     Admit Date: 12/12/2017    Attending Physician: Shiv Alfaro Jr., MD     Discharge Physician: Shiv Alfaro Jr., MD    Discharge Date: 12/12/2017 9:10 AM    Final Diagnosis: Nausea [R11.0]  Weight loss [R63.4]    (Minimal) antritis.  Atrophic mucosa.  Dementia a contributing factor.     Disposition: HOME OR SELF CARE    Patient Instructions:   Current Discharge Medication List      START taking these medications    Details   ranitidine (ZANTAC) 150 MG tablet Take 1 tablet (150 mg total) by mouth 2 (two) times daily before meals. , ac BF & ac Supper.  Qty: 30 tablet, Refills: 11         CONTINUE these medications which have NOT CHANGED    Details   alprazolam (XANAX) 0.25 MG tablet TAKE ONE TABLET FOUR TIMES DAILY  Qty: 120 tablet, Refills: 1    Comments: This prescription was filled today(4/12/2017). Any refills authorized will be placed on file.      aspirin 325 MG tablet Take 1 tablet (325 mg total) by mouth 2 (two) times daily.  Qty: 60 tablet, Refills: 0      benazepril-hydrochlorthiazide (LOTENSIN HCT) 10-12.5 mg Tab Take 1 tablet by mouth once daily.  Qty: 90 tablet, Refills: 3      donepezil (ARICEPT) 10 MG tablet Take 1 tablet (10 mg total) by mouth every evening.  Qty: 30 tablet, Refills: 11    Associated Diagnoses: Mild cognitive impairment with memory loss      fluticasone-salmeterol (ADVAIR DISKUS) 250-50 mcg/dose diskus inhaler Inhale 1 puff into the lungs 2 (two) times daily. Twice a day      metformin (GLUCOPHAGE) 500 MG tablet TAKE three TABLETS BY MOUTH EVERY MORNING and 3 EVERY EVENING  Qty: 180 tablet, Refills: 3    Comments: This prescription was filled today. Any refills authorized will be placed on file.      MULTIVITAMIN ORAL Take 1 tablet by mouth every morning. Every day      ondansetron (ZOFRAN-ODT) 4 MG TbDL ONE UNDER THE TONGUE EVERY 4 HOURS AS NEEDED FOR NAUSEA  Refills: 0             Discharge Procedure Orders (must include Diet,  Follow-up, Activity)    Follow Up:  Follow up with PCP as per your routine.  Please follow an anti reflux diet.  Activity as tolerated.    No driving day of procedure.

## 2017-12-12 NOTE — ANESTHESIA PREPROCEDURE EVALUATION
12/12/2017  Cheryl Quiroga is a 72 y.o., female.    Anesthesia Evaluation      I have reviewed the Medications.     Review of Systems  Anesthesia Hx:  No problems with previous Anesthesia   Social:  Non-Smoker, No Alcohol Use    Cardiovascular:   Hypertension    Pulmonary:   Asthma    Renal/:  Renal/ Normal     Hepatic/GI:   GERD    Neurological:   Peripheral Neuropathy    Endocrine:   Diabetes        Physical Exam  General:  Well nourished    Airway/Jaw/Neck:  Airway Findings: Mouth Opening: Normal General Airway Assessment: Adult  Mallampati: II  Jaw/Neck Findings:  Neck ROM: Extension Decreased, Mild       Chest/Lungs:  Chest/Lungs Findings: Clear to auscultation, Normal Respiratory Rate     Heart/Vascular:  Heart Findings: Rate: Normal  Rhythm: Regular Rhythm  Sounds: Normal  Heart murmur: negative Vascular Findings: Normal (No carotid bruits.)       Mental Status:  Mental Status Findings:  Cooperative, Alert and Oriented         Anesthesia Plan  Type of Anesthesia, risks & benefits discussed:  Anesthesia Type:  general  Patient's Preference:   Intra-op Monitoring Plan:   Intra-op Monitoring Plan Comments:   Post Op Pain Control Plan:   Post Op Pain Control Plan Comments:   Induction:   IV  Beta Blocker:  Patient is not currently on a Beta-Blocker (No further documentation required).       Informed Consent: Patient understands risks and agrees with Anesthesia plan.  Questions answered. Anesthesia consent signed with patient.  ASA Score: 2     Day of Surgery Review of History & Physical:        Anesthesia Plan Notes: Propofol general.        Ready For Surgery From Anesthesia Perspective.

## 2017-12-12 NOTE — H&P
History & Physical - Short Stay  Gastroenterology      SUBJECTIVE:     Procedure: Gastroscopy    Chief Complaint/Indication for Procedure: Abdo pain.  N/V.  Weight loss.    History of Present Illness:  Office Visit     10/26/2017  [place] [facility]   SHIVANI Muller   Gastroenterology   Diarrhea, unspecified type +6 more   Dx   Diarrhea ; Referred by Augie Morris MD   Reason for Visit    Progress Notes        Subjective:       Patient ID: Cheryl Quiroga is a 72 y.o. female Body mass index is 27.07 kg/m².     Chief Complaint: Diarrhea ( and n/v s/p knee surgery in August)     This patient is new to me.  Referring Provider:  Dr. Morris for weight loss.  Established patient of Dr. Pimentel.     GI Problem   The primary symptoms include weight loss (lost about 21 lbs since August 2017, denies trying to lose weight), fatigue, abdominal pain (lower abdomen, had with vomiting; has resolved), nausea, vomiting (has when symptoms first started, emesis was a few times a day of green bile; denies any bright red blood or coffee ground emesis; was worse after eating; has resolved, last occurred 3 weeks ago) and diarrhea (when first started has it- lasted for 1 week; then intermittent, has resolved; was worse with green vegetables). Primary symptoms do not include fever, melena, hematemesis, hematochezia or dysuria. The illness began more than 7 days ago (everything started after knee surgery 8/2017). The problem has been rapidly improving.   The illness does not include chills, dysphagia, odynophagia, bloating or constipation. Associated symptoms comments: Recent hospitalization for knee surgery; Denies recent antibiotics, foreign travel, ill contacts, or suspect food intake; been on metformin for years. Significant associated medical issues include diverticulitis. Associated medical issues do not include inflammatory bowel disease, GERD or PUD.      Review of Systems   Constitutional: Positive for appetite  "change (decreased; eating 2-3 small meals/snacks a day), fatigue and weight loss (lost about 21 lbs since August 2017, denies trying to lose weight). Negative for chills and fever.   HENT: Negative for sore throat and trouble swallowing.    Respiratory: Negative for cough, choking and shortness of breath.    Cardiovascular: Negative for chest pain.   Gastrointestinal: Positive for abdominal pain (lower abdomen, had with vomiting; has resolved), diarrhea (when first started has it- lasted for 1 week; then intermittent, has resolved; was worse with green vegetables), nausea and vomiting (has when symptoms first started, emesis was a few times a day of green bile; denies any bright red blood or coffee ground emesis; was worse after eating; has resolved, last occurred 3 weeks ago). Negative for anal bleeding, bloating, blood in stool, constipation, dysphagia, hematemesis, hematochezia, melena and rectal pain.     8/17/15 EGD was reviewed and procedure report states:   " Impression:- Z-line regular, 40 cm from the incisors.                        - Normal esophagus.                        - Normal stomach.                        - Normal examined duodenum.                        - Multiple biopsies were obtained in the gastric                         antrum at 2nd part of the duodenum.  Recommendation:       - The patient will be observed post-procedure,                         until all discharge criteria are met.                        - Discharge patient to home (ambulatory).                        - Patient has a contact number available for                         emergencies. The signs and symptoms of potential                         delayed complications were discussed with the                         patient. Return to normal activities tomorrow.                         Written discharge instructions were provided to the                         patient.                        - Resume previous diet today.       " "                 - Continue present medications.                        - Await pathology results.                        - Follow an antireflux regimen daily.".  Biopsy results:   "1. Small bowel, biopsy:  Normal small bowel mucosa.  Negative for significant inflammation and villous blunting.  2. Stomach, antrum, biopsy:  Marked chronic gastritis.  Immunohistochemical staining for Helicobacter is negative (satisfactory control material)."    Assessment:       1. Diarrhea, unspecified type    2. History of abdominal pain    3. Non-intractable vomiting with nausea, unspecified vomiting type    4. S/P knee surgery    5. Weight loss    6. Decreased appetite    7. Anticoagulant long-term use        Plan:       Diarrhea, unspecified type  -     TSH; Future; Expected date: 10/26/2017  -     IgA; Future; Expected date: 10/26/2017  -     Tissue transglutaminase, IgA; Future; Expected date: 10/26/2017  -     Adenovirus Antigen EIA, Stool; Future; Expected date: 10/26/2017  -     Giardia / Cryptosporidum, EIA; Future; Expected date: 10/26/2017  -     Occult blood x 1, stool; Future; Expected date: 10/26/2017  -     Rotavirus antigen, stool; Future; Expected date: 10/26/2017  -     Stool Exam-Ova,Cysts,Parasites; Future; Expected date: 10/26/2017  -     WBC, Stool; Future; Expected date: 11/09/2017  -     Stool culture; Future; Expected date: 11/09/2017  -     Clostridium difficile EIA; Future; Expected date: 10/26/2017  - recommended OTC probiotic, such as Align or Culturelle, as directed  - avoid lactose  - if diarrhea recurs, recommend to complete above stool studies     History of Abdominal pain, & Non-intractable vomiting with nausea, unspecified vomiting type  - schedule EGD, discussed procedure with patient, patient verbalized understanding  - abdominal pain and vomiting have resolved; if they recur, recommend follow-up for continued evaluation and management  - possible ct scan if symptoms recur/persist and pending " results of testing     S/P knee surgery  Recommend follow-up with surgeon for continued evaluation and management.     Weight loss & Decreased appetite  -     TSH; Future; Expected date: 10/26/2017  -     IgA; Future; Expected date: 10/26/2017  -     Tissue transglutaminase, IgA; Future; Expected date: 10/26/2017  - encouraged PO intake and daily calorie counts to ensure adequate nutrition is taken in, recommend at least 1,800-2,000 calories a day  - recommend nutritional drinks, such as Glucerna, to supplement nutrition needs  - schedule EGD, discussed procedure with patient, patient verbalized understanding  - possible ct scan if symptoms persist and pending results of testing     Anticoagulant long-term use  - patient is on blood-thinner(s)=aspirin, informed patient that it will likely need to be held for endoscopy, nurse will confirm with cardiologist/PCP.     Return in about 1 month (around 11/26/2017), or if symptoms worsen or fail to improve.            U/S 10/20/2017  Impression    No acute intra-abdominal abnormality.    Electronically signed by: ROGER WASHINGTON MD  Date: 10/20/17       PTA Medications   Medication Sig    alprazolam (XANAX) 0.25 MG tablet TAKE ONE TABLET FOUR TIMES DAILY (Patient taking differently: TAKE ONE TABLET FOUR TIMES DAILY  as needed)    aspirin 325 MG tablet Take 1 tablet (325 mg total) by mouth 2 (two) times daily.    benazepril-hydrochlorthiazide (LOTENSIN HCT) 10-12.5 mg Tab Take 1 tablet by mouth once daily.    donepezil (ARICEPT) 10 MG tablet Take 1 tablet (10 mg total) by mouth every evening.    fluticasone-salmeterol (ADVAIR DISKUS) 250-50 mcg/dose diskus inhaler Inhale 1 puff into the lungs 2 (two) times daily. Twice a day    metformin (GLUCOPHAGE) 500 MG tablet TAKE three TABLETS BY MOUTH EVERY MORNING and 3 EVERY EVENING    MULTIVITAMIN ORAL Take 1 tablet by mouth every morning. Every day    ondansetron (ZOFRAN-ODT) 4 MG TbDL ONE UNDER THE TONGUE EVERY 4 HOURS AS  "NEEDED FOR NAUSEA       Review of patient's allergies indicates:   Allergen Reactions    Codeine      Other reaction(s): Unknown    Iodine      Other reaction(s): Unknown        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    Migraines     Sciatica, left side 3/22/2017     Past Surgical History:   Procedure Laterality Date    COLONOSCOPY  2010    COLONOSCOPY  08/17/2015    Dr. Pimentel, repeat in 5 years    HYSTERECTOMY      ovaries removed    JOINT REPLACEMENT      right knee    KNEE SURGERY Bilateral 08/2017    UPPER GASTROINTESTINAL ENDOSCOPY  08/17/2015    Dr. Pimentel     Family History   Problem Relation Age of Onset    Heart disease Mother     Hypertension Father     Diabetes Father     Colon polyps Father     Colon cancer Neg Hx     Stomach cancer Neg Hx     Crohn's disease Neg Hx     Ulcerative colitis Neg Hx     Celiac disease Neg Hx      Social History   Substance Use Topics    Smoking status: Never Smoker    Smokeless tobacco: Never Used    Alcohol use No         OBJECTIVE:     Vital Signs (Most Recent)  Temp: 97.3 °F (36.3 °C) (12/12/17 0742)  Pulse: (!) 59 (12/12/17 0742)  Resp: 18 (12/12/17 0742)  BP: (!) 159/72 (12/12/17 0742)  SpO2: 98 % (12/12/17 0742)    Physical Exam:          :  Ht 5' 5" (1.651 m)    Wt 73.8 kg (162 lb 11.2 oz)    BMI 27.07 kg/m²                                     GENERAL:  Comfortable, in no acute distress.                                 HEENT EXAM:  Nonicteric.  No adenopathy.  Oropharynx is clear.               NECK: "  Supple.                                                               LUNGS:  Clear.                                                               CARDIAC:  Regular rate and rhythm.  S1, S2.  No murmur.                      ABDOMEN:  Soft, positive bowel sounds, nontender.  No hepatosplenomegaly or masses.  No rebound or guarding.                                             EXTREMITIES:  No edema.     MENTAL STATUS:  Alert and oriented.    ASSESSMENT/PLAN:     Assessment: Abdo pain.  N/V.  Weight loss..    Plan: Gastroscopy    Anesthesia Plan:   MAC / General Anaesthesia    ASA Grade: ASA 2 - Patient with mild systemic disease with no functional limitations    MALLAMPATI SCORE: I (soft palate, uvula, fauces, and tonsillar pillars visible)

## 2017-12-12 NOTE — ANESTHESIA POSTPROCEDURE EVALUATION
"Anesthesia Post Evaluation    Patient: Cheryl Quiroga    Procedure(s) Performed: Procedure(s) (LRB):  ESOPHAGOGASTRODUODENOSCOPY (EGD) (N/A)    Final Anesthesia Type: general  Patient location during evaluation: PACU  Patient participation: Yes- Able to Participate  Level of consciousness: awake and alert and oriented  Post-procedure vital signs: reviewed and stable  Pain management: adequate  Airway patency: patent  PONV status at discharge: No PONV  Anesthetic complications: no      Cardiovascular status: hemodynamically stable and blood pressure returned to baseline  Respiratory status: unassisted, spontaneous ventilation and room air  Hydration status: euvolemic  Follow-up not needed.        Visit Vitals  BP (!) 100/55 (BP Location: Left arm, Patient Position: Lying)   Pulse 69   Temp 36.7 °C (98 °F) (Skin)   Resp 18   Ht 5' 5" (1.651 m)   Wt 76.2 kg (168 lb)   SpO2 97%   Breastfeeding? No   BMI 27.96 kg/m²       Pain/Brad Score: Pain Assessment Performed: Yes (12/12/2017  8:47 AM)  Presence of Pain: other (see comments) (pt sleeping no signs of pain noted) (12/12/2017  8:47 AM)  Brad Score: 6 (12/12/2017  8:47 AM)      "

## 2017-12-13 ENCOUNTER — TELEPHONE (OUTPATIENT)
Dept: GASTROENTEROLOGY | Facility: CLINIC | Age: 72
End: 2017-12-13

## 2017-12-26 RX ORDER — OMEPRAZOLE 40 MG/1
40 CAPSULE, DELAYED RELEASE ORAL
Qty: 30 CAPSULE | Refills: 1 | Status: SHIPPED | OUTPATIENT
Start: 2017-12-26 | End: 2019-07-23

## 2017-12-26 RX ORDER — AMOXICILLIN 500 MG/1
1000 CAPSULE ORAL 2 TIMES DAILY WITH MEALS
Qty: 40 CAPSULE | Refills: 0 | Status: SHIPPED | OUTPATIENT
Start: 2017-12-26 | End: 2018-01-05

## 2017-12-26 RX ORDER — CLARITHROMYCIN 500 MG/1
500 TABLET, FILM COATED ORAL 2 TIMES DAILY WITH MEALS
Qty: 20 TABLET | Refills: 0 | Status: SHIPPED | OUTPATIENT
Start: 2017-12-26 | End: 2018-01-05

## 2017-12-26 NOTE — TELEPHONE ENCOUNTER
Let her know her path came back showing evidence of the bacteria, H pylori.  And we're gonna treat that.    10 days with 2 abx.  And 2 weeks of Prilosec.  (Hold the ranitidine while taking the Prilosec (OMEPRAZOLE).    Biaxin 500, # 20, 1 po bid with meals x 10 days.  Amoxicillin 500 mg, # 40, 2 po bid with meals x 10 days.  Omeprazole 40 mg,  1 po bid (before breakfast and 1 hour before supper) for 2 weeks, then back to the ranitidine.

## 2018-01-08 ENCOUNTER — TELEPHONE (OUTPATIENT)
Dept: FAMILY MEDICINE | Facility: CLINIC | Age: 73
End: 2018-01-08

## 2018-01-08 RX ORDER — NYSTATIN 100000 [USP'U]/ML
4 SUSPENSION ORAL 4 TIMES DAILY
Qty: 160 ML | Refills: 0 | Status: SHIPPED | OUTPATIENT
Start: 2018-01-08 | End: 2018-01-18

## 2018-01-08 NOTE — TELEPHONE ENCOUNTER
----- Message from Enedina Whatley sent at 1/8/2018 12:28 PM CST -----  Contact: [phone]   Pt would like to consult with the nurse, Pt is having mouth pain and need an Rx Called in.  Please call back at [phone].  x-         Pt Uses:  .  [facility] - [place] - [place], [place] - [address]  [address]  [place] [address]  Phone: [phone] Fax: [phone]

## 2018-01-08 NOTE — TELEPHONE ENCOUNTER
Patient states she is having trouble eating any solid food due to mouth pain. She states she does not have any visible sores, but her mouth is burning. Can you please Rx something? Please send to [facility], and we can notify patient.

## 2018-01-11 RX ORDER — METFORMIN HYDROCHLORIDE 500 MG/1
TABLET ORAL
Qty: 120 TABLET | Refills: 3 | Status: SHIPPED | OUTPATIENT
Start: 2018-01-11 | End: 2018-10-19 | Stop reason: SDUPTHER

## 2018-01-15 DIAGNOSIS — G31.84 MILD COGNITIVE IMPAIRMENT WITH MEMORY LOSS: ICD-10-CM

## 2018-01-15 RX ORDER — DONEPEZIL HYDROCHLORIDE 10 MG/1
10 TABLET, FILM COATED ORAL NIGHTLY
Qty: 30 TABLET | Refills: 11 | Status: SHIPPED | OUTPATIENT
Start: 2018-01-15 | End: 2019-11-12 | Stop reason: SDUPTHER

## 2018-01-24 ENCOUNTER — OFFICE VISIT (OUTPATIENT)
Dept: FAMILY MEDICINE | Facility: CLINIC | Age: 73
End: 2018-01-24
Payer: MEDICARE

## 2018-01-24 ENCOUNTER — LAB VISIT (OUTPATIENT)
Dept: LAB | Facility: HOSPITAL | Age: 73
End: 2018-01-24
Attending: FAMILY MEDICINE
Payer: MEDICARE

## 2018-01-24 VITALS
HEART RATE: 69 BPM | WEIGHT: 159.81 LBS | DIASTOLIC BLOOD PRESSURE: 72 MMHG | HEIGHT: 65 IN | SYSTOLIC BLOOD PRESSURE: 119 MMHG | TEMPERATURE: 98 F | BODY MASS INDEX: 26.63 KG/M2

## 2018-01-24 DIAGNOSIS — I10 ESSENTIAL HYPERTENSION: ICD-10-CM

## 2018-01-24 DIAGNOSIS — G31.84 MILD COGNITIVE IMPAIRMENT WITH MEMORY LOSS: ICD-10-CM

## 2018-01-24 DIAGNOSIS — R63.4 WEIGHT LOSS: ICD-10-CM

## 2018-01-24 DIAGNOSIS — R10.9 ABDOMINAL PAIN, UNSPECIFIED ABDOMINAL LOCATION: Primary | ICD-10-CM

## 2018-01-24 DIAGNOSIS — R10.9 ABDOMINAL PAIN, UNSPECIFIED ABDOMINAL LOCATION: ICD-10-CM

## 2018-01-24 DIAGNOSIS — E11.9 TYPE 2 DIABETES MELLITUS WITHOUT COMPLICATION, WITHOUT LONG-TERM CURRENT USE OF INSULIN: ICD-10-CM

## 2018-01-24 DIAGNOSIS — K21.9 GASTROESOPHAGEAL REFLUX DISEASE, ESOPHAGITIS PRESENCE NOT SPECIFIED: ICD-10-CM

## 2018-01-24 PROBLEM — Z96.659 S/P TKR (TOTAL KNEE REPLACEMENT) USING CEMENT: Status: RESOLVED | Noted: 2017-08-14 | Resolved: 2018-01-24

## 2018-01-24 PROBLEM — M54.32 SCIATICA, LEFT SIDE: Status: RESOLVED | Noted: 2017-03-22 | Resolved: 2018-01-24

## 2018-01-24 LAB
AMYLASE SERPL-CCNC: 88 U/L
ESTIMATED AVG GLUCOSE: 126 MG/DL
HBA1C MFR BLD HPLC: 6 %

## 2018-01-24 PROCEDURE — 36415 COLL VENOUS BLD VENIPUNCTURE: CPT | Mod: PO

## 2018-01-24 PROCEDURE — 83036 HEMOGLOBIN GLYCOSYLATED A1C: CPT

## 2018-01-24 PROCEDURE — 82150 ASSAY OF AMYLASE: CPT

## 2018-01-24 PROCEDURE — 99213 OFFICE O/P EST LOW 20 MIN: CPT | Mod: PBBFAC,PO | Performed by: FAMILY MEDICINE

## 2018-01-24 PROCEDURE — 99214 OFFICE O/P EST MOD 30 MIN: CPT | Mod: S$PBB,,, | Performed by: FAMILY MEDICINE

## 2018-01-24 PROCEDURE — 99999 PR PBB SHADOW E&M-EST. PATIENT-LVL III: CPT | Mod: PBBFAC,,, | Performed by: FAMILY MEDICINE

## 2018-01-24 NOTE — PROGRESS NOTES
The patient presents with a 4-6m history of nausea vomiting previously diarrhea.Had nl Abd US and extensive GI [place] lab nl EGD cw atrophic gastritis. Additional 5# wt loss p 3 m. Did not feel symptoms onset a year ago when started aricept. No hematemesis,melena nor severe abdominal cramps.Still able to tolerate food.   DM prob controlled Past Medical History:  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    Migraines     Sciatica, left side 3/22/2017     Past Surgical History:   Procedure Laterality Date    COLONOSCOPY  2010    COLONOSCOPY  08/17/2015    Dr. Pimentel, repeat in 5 years    HYSTERECTOMY      ovaries removed    JOINT REPLACEMENT      right knee    KNEE SURGERY Bilateral 08/2017    UPPER GASTROINTESTINAL ENDOSCOPY  08/17/2015    Dr. Pimentel     Review of patient's allergies indicates:   Allergen Reactions    Codeine      Other reaction(s): Unknown    Iodine      Other reaction(s): Unknown     Current Outpatient Prescriptions on File Prior to Visit   Medication Sig Dispense Refill    alprazolam (XANAX) 0.25 MG tablet TAKE ONE TABLET FOUR TIMES DAILY (Patient taking differently: TAKE ONE TABLET FOUR TIMES DAILY  as needed) 120 tablet 1    aspirin 325 MG tablet Take 1 tablet (325 mg total) by mouth 2 (two) times daily. 60 tablet 0    benazepril-hydrochlorthiazide (LOTENSIN HCT) 10-12.5 mg Tab Take 1 tablet by mouth once daily. 90 tablet 3    donepezil (ARICEPT) 10 MG tablet Take 1 tablet (10 mg total) by mouth every evening. 30 tablet 11     fluticasone-salmeterol (ADVAIR DISKUS) 250-50 mcg/dose diskus inhaler Inhale 1 puff into the lungs 2 (two) times daily. Twice a day      metFORMIN (GLUCOPHAGE) 500 MG tablet TAKE 2TABLETS BY MOUTH EVERY MORNING and 2 EVERY EVENING 120 tablet 3    MULTIVITAMIN ORAL Take 1 tablet by mouth every morning. Every day      omeprazole (PRILOSEC) 40 MG capsule Take 1 capsule (40 mg total) by mouth 2 (two) times daily before meals. 30 capsule 1    ondansetron (ZOFRAN-ODT) 4 MG TbDL ONE UNDER THE TONGUE EVERY 4 HOURS AS NEEDED FOR NAUSEA  0    ranitidine (ZANTAC) 150 MG tablet Take 1 tablet (150 mg total) by mouth 2 (two) times daily before meals. , ac BF & ac Supper. 30 tablet 11     No current facility-administered medications on file prior to visit.      Social History     Social History    Marital status:      Spouse name: N/A    Number of children: N/A    Years of education: N/A     Occupational History    Not on file.     Social History Main Topics    Smoking status: Never Smoker    Smokeless tobacco: Never Used    Alcohol use No    Drug use: No    Sexual activity: Yes     Partners: Male     Other Topics Concern    Not on file     Social History Narrative    No narrative on file     Family History   Problem Relation Age of Onset    Heart disease Mother     Hypertension Father     Diabetes Father     Colon polyps Father     Colon cancer Neg Hx     Stomach cancer Neg Hx     Crohn's disease Neg Hx     Ulcerative colitis Neg Hx     Celiac disease Neg Hx        ROS:  SKIN: No rashes, itching or changes in color or texture of skin.  EYES: Visual acuity fine. No photophobia, ocular pain or diplopia.EARS: Denies ear pain, discharge or vertigo.NOSE: No loss of smell, no epistaxis or postnasal drip.MOUTH & THROAT: No hoarseness or change in voice. No excessive gum bleeding.NODES: Denies swollen glands.  CHEST: Denies GARCIA, cyanosis, wheezing, cough and sputum production.  CARDIOVASCULAR: Denies chest  pain, PND, orthopnea or reduced exercise tolerance.  ABDOMEN:  No weight loss.Mild abdominal pain, no hematemesis or blood in stool.  URINARY: No flank pain, dysuria or hematuria.  PERIPHERAL VASCULAR: No claudication or cyanosis.  MUSCULOSKELETAL: Negative   NEUROLOGIC: No history of seizures, paralysis, alteration of gait or coordination.    PE Vital signs noted  Heent: Normocephalic,with no recent trauma,PERRLA,EOMI,conjunctiva clear with no exudate.Nasopharynx is not injected .Otic canal.TMs are not affected.Pharynx is slightly red.   Neck:Supple without adenopathy  Chest:Clear bilateral breath sounds normal  Heart:Regular rhthym without murmer  Abdomen:Soft, mild generalized tenderness,no rebound,no masses, no hepatosplenomegaly  Extremeties and Neurologic:Grossly within normal limits  Impression: Abd pain  Vomiting  Wt loss  DM  Dementia    Plan:Rev diet recs  Temp hold aricept x 2 w and report  Rec incr ranitidine to 300 bid

## 2018-02-02 DIAGNOSIS — E11.9 TYPE 2 DIABETES MELLITUS WITHOUT COMPLICATION: ICD-10-CM

## 2018-03-13 RX ORDER — ALPRAZOLAM 0.25 MG/1
TABLET ORAL
Qty: 60 TABLET | Refills: 1 | Status: SHIPPED | OUTPATIENT
Start: 2018-03-13 | End: 2018-09-29 | Stop reason: SDUPTHER

## 2018-03-14 NOTE — TELEPHONE ENCOUNTER
I think the more she takes of this the more likely she is to worsen her memory problems. Unless her memory problems have resolved I would try to reduce to 2 x day and let me know how she is doing with it. FU OV 7-8 weeks

## 2018-03-15 RX ORDER — BENAZEPRIL HYDROCHLORIDE AND HYDROCHLOROTHIAZIDE 10; 12.5 MG/1; MG/1
1 TABLET ORAL DAILY
Qty: 90 TABLET | Refills: 4 | Status: SHIPPED | OUTPATIENT
Start: 2018-03-15 | End: 2019-01-30

## 2018-04-30 ENCOUNTER — PATIENT OUTREACH (OUTPATIENT)
Dept: ADMINISTRATIVE | Facility: HOSPITAL | Age: 73
End: 2018-04-30

## 2018-04-30 NOTE — LETTER
April 30, 2018    Cheryl Quiroga  [address] [facility] [address]           [facility]  [address]  [facility] [address]  Phone: [phone] Dear Mrs. Quiroga:    [facility] is committed to your overall health.  To help you get the most out of each of your visits, we will review your information to make sure you are up to date on all of your recommended tests and/or procedures.      Augie Morris MD has found that you may be due for   Health Maintenance Due   Topic    Eye Exam     Influenza Vaccine     Lipid Panel     Foot Exam     Mammogram       If you have had any of the above done at another facility, please bring the records or information with you so that your record at [facility] will be complete.    If you are currently taking medication, please bring it with you to your appointment for review.    We will be happy to assist you with scheduling any necessary appointments or you may contact the [facility] appointment desk at [phone] to schedule at your convenience.     Thank you for choosing [facility] for your healthcare needs,    If you have any questions or concerns, please don't hesitate to call.    Sincerely,    HEMANTH York  Care Coordination Department  [facility]  [phone]

## 2018-05-15 ENCOUNTER — PATIENT OUTREACH (OUTPATIENT)
Dept: ADMINISTRATIVE | Facility: HOSPITAL | Age: 73
End: 2018-05-15

## 2018-05-15 NOTE — PROGRESS NOTES
Mammogram dated 5/10/18 recieved from [facility] IMPRESSION: BIRADS 0: NEEDS ADDITIONAL EVALUATION.  HM updated and report sent to scanning.

## 2018-06-04 ENCOUNTER — PATIENT OUTREACH (OUTPATIENT)
Dept: ADMINISTRATIVE | Facility: HOSPITAL | Age: 73
End: 2018-06-04

## 2018-06-04 NOTE — LETTER
June 4, 2018        Cheryl CHAPMAN Junaid  [address] [facility] [address]      Dear Mrs. Quiroga,    [facility] is committed to your overall health.  To help you get the most out of each of your visits, we will review your information to make sure you are up to date on all of your recommended tests and/or procedures.      Augie Morris MD has found that you may be due for   Health Maintenance Due   Topic    Eye Exam     Lipid Panel     Foot Exam       If you have had any of the above done at another facility, please bring the records or information with you so that your record at [facility] will be complete.    We will be happy to assist you with scheduling any necessary appointments or you may contact the [facility] appointment desk at [phone] to schedule at your convenience.     Thank you for choosing [facility] for your healthcare needs,      HEMANTH York  Care Coordination Department  [facility]  [phone]

## 2018-06-18 ENCOUNTER — OFFICE VISIT (OUTPATIENT)
Dept: FAMILY MEDICINE | Facility: CLINIC | Age: 73
End: 2018-06-18
Payer: MEDICARE

## 2018-06-18 VITALS
WEIGHT: 176.19 LBS | DIASTOLIC BLOOD PRESSURE: 69 MMHG | BODY MASS INDEX: 29.36 KG/M2 | TEMPERATURE: 98 F | HEART RATE: 65 BPM | HEIGHT: 65 IN | SYSTOLIC BLOOD PRESSURE: 112 MMHG

## 2018-06-18 DIAGNOSIS — E11.9 TYPE 2 DIABETES MELLITUS WITHOUT COMPLICATION, WITHOUT LONG-TERM CURRENT USE OF INSULIN: ICD-10-CM

## 2018-06-18 DIAGNOSIS — G47.9 SLEEP DISORDER: Primary | ICD-10-CM

## 2018-06-18 DIAGNOSIS — N64.89 BREAST ASYMMETRY: ICD-10-CM

## 2018-06-18 PROCEDURE — 99999 PR PBB SHADOW E&M-EST. PATIENT-LVL III: CPT | Mod: PBBFAC,,, | Performed by: FAMILY MEDICINE

## 2018-06-18 PROCEDURE — 99213 OFFICE O/P EST LOW 20 MIN: CPT | Mod: S$PBB,,, | Performed by: FAMILY MEDICINE

## 2018-06-18 PROCEDURE — 99213 OFFICE O/P EST LOW 20 MIN: CPT | Mod: PBBFAC,PO | Performed by: FAMILY MEDICINE

## 2018-06-18 RX ORDER — GABAPENTIN 300 MG/1
300 CAPSULE ORAL NIGHTLY
Qty: 30 CAPSULE | Refills: 11 | Status: SHIPPED | OUTPATIENT
Start: 2018-06-18 | End: 2018-09-18

## 2018-06-18 NOTE — PROGRESS NOTES
The patient presents today-recent mammo rec pot comp bdt assymetry but pt reports long term assymetry and no findings on exam. Rev risk benefits additional; testing but pt wished to resume routine screening next year. Also co sleep issues, initiating well but wakes 2hrs later Past Medical History:  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    Migraines     Sciatica, left side 3/22/2017     Past Surgical History:   Procedure Laterality Date    COLONOSCOPY  2010    COLONOSCOPY  08/17/2015    Dr. Pimentel, repeat in 5 years    HYSTERECTOMY      ovaries removed    JOINT REPLACEMENT      right knee    KNEE SURGERY Bilateral 08/2017    UPPER GASTROINTESTINAL ENDOSCOPY  08/17/2015    Dr. Pimentel     Review of patient's allergies indicates:   Allergen Reactions    Codeine      Other reaction(s): Unknown    Iodine      Other reaction(s): Unknown     Current Outpatient Prescriptions on File Prior to Visit   Medication Sig Dispense Refill    alprazolam (XANAX) 0.25 MG tablet TAKE ONE TABLET FOUR TIMES DAILY (Patient taking differently: TAKE ONE TABLET FOUR TIMES DAILY  as needed) 120 tablet 1    ALPRAZolam (XANAX) 0.25 MG tablet TAKE ONE TABLET FOUR TIMES DAILY 60 tablet 1    aspirin 325 MG tablet Take 1 tablet (325 mg total) by mouth 2 (two) times daily. 60 tablet 0    benazepril-hydrochlorthiazide (LOTENSIN HCT) 10-12.5 mg Tab Take 1 tablet by mouth once daily. 90 tablet 4    donepezil (ARICEPT) 10 MG tablet Take 1 tablet (10 mg total) by mouth every evening. 30 tablet  11    fluticasone-salmeterol (ADVAIR DISKUS) 250-50 mcg/dose diskus inhaler Inhale 1 puff into the lungs 2 (two) times daily. Twice a day      metFORMIN (GLUCOPHAGE) 500 MG tablet TAKE 2TABLETS BY MOUTH EVERY MORNING and 2 EVERY EVENING 120 tablet 3    MULTIVITAMIN ORAL Take 1 tablet by mouth every morning. Every day      ranitidine (ZANTAC) 300 MG tablet Take 1 tablet (300 mg total) by mouth 2 (two) times daily. 60 tablet 11    omeprazole (PRILOSEC) 40 MG capsule Take 1 capsule (40 mg total) by mouth 2 (two) times daily before meals. 30 capsule 1    [DISCONTINUED] ondansetron (ZOFRAN-ODT) 4 MG TbDL ONE UNDER THE TONGUE EVERY 4 HOURS AS NEEDED FOR NAUSEA  0    [DISCONTINUED] ranitidine (ZANTAC) 150 MG tablet Take 1 tablet (150 mg total) by mouth 2 (two) times daily before meals. , ac BF & ac Supper. 30 tablet 11     No current facility-administered medications on file prior to visit.      Social History     Social History    Marital status:      Spouse name: N/A    Number of children: N/A    Years of education: N/A     Occupational History    Not on file.     Social History Main Topics    Smoking status: Never Smoker    Smokeless tobacco: Never Used    Alcohol use No    Drug use: No    Sexual activity: Yes     Partners: Male     Other Topics Concern    Not on file     Social History Narrative    No narrative on file     Family History   Problem Relation Age of Onset    Heart disease Mother     Hypertension Father     Diabetes Father     Colon polyps Father     Colon cancer Neg Hx     Stomach cancer Neg Hx     Crohn's disease Neg Hx     Ulcerative colitis Neg Hx     Celiac disease Neg Hx          ROS:GENERAL: No fever, chills, fatigability or weight loss.  SKIN: No rashes, itching or changes in color or texture of skin.  HEAD: No headaches or recent head trauma.EYES: Visual acuity fine. No photophobia, ocular pain or diplopia.EARS: Denies ear pain, discharge or vertigo.NOSE: No loss  of smell, no epistaxis or postnasal drip.MOUTH & THROAT: No hoarseness or change in voice. No excessive gum bleeding.NODES: Denies swollen glands.  CHEST: Denies GARCIA, cyanosis, wheezing, cough and sputum production.  CARDIOVASCULAR: Denies chest pain, PND, orthopnea or reduced exercise tolerance.  ABDOMEN: Appetite fine. No weight loss. Denies diarrhea, abdominal pain, hematemesis or blood in stool.  URINARY: No flank pain, dysuria or hematuria.  PERIPHERAL VASCULAR: No claudication or cyanosis.  MUSCULOSKELETAL: See above.  NEUROLOGIC: No history of seizures, paralysis, alteration of gait or coordination.  PE:   HEAD: Normocephalic, atraumatic.EYES: PERRL. EOMI.   EARS: TM's intact. Light reflex normal. No retraction or perforation.   NOSE: Mucosa pink. Airway clear.MOUTH & THROAT: No tonsillar enlargement. No pharyngeal erythema or exudate. No stridor.  NODES: No cervical, axillary or inguinal lymph node enlargement.  CHEST: Lungs clear to auscultation.  CARDIOVASCULAR: Normal S1, S2. No rubs, murmurs or gallops.  ABDOMEN: Bowel sounds normal. Not distended. Soft. No tenderness or masses.  MUSCULOSKELETAL: No palpable abnormality  NEUROLOGIC: Cranial Nerves: II-XII grossly intact.  Motor: 5/5 strength major flexors/extensors.  DTR's: Knees, Ankles 2+ and equal bilaterally; downgoing toes.  Sensory: Intact to light touch distally.  Gait & Posture: Normal gait and fine motion. No cerebellar signs.     Impression:Assymetric breasts  Sleep disorder  Ur incont  Plan:See above discusion

## 2018-08-17 DIAGNOSIS — E11.9 TYPE 2 DIABETES MELLITUS WITHOUT COMPLICATION: ICD-10-CM

## 2018-08-21 RX ORDER — ALPRAZOLAM 0.25 MG/1
TABLET ORAL
Qty: 60 TABLET | OUTPATIENT
Start: 2018-08-21

## 2018-09-18 ENCOUNTER — TELEPHONE (OUTPATIENT)
Dept: FAMILY MEDICINE | Facility: CLINIC | Age: 73
End: 2018-09-18

## 2018-09-18 RX ORDER — GABAPENTIN 300 MG/1
900 CAPSULE ORAL NIGHTLY
Qty: 90 CAPSULE | Refills: 11 | Status: SHIPPED | OUTPATIENT
Start: 2018-09-18 | End: 2019-10-01 | Stop reason: SDUPTHER

## 2018-09-18 NOTE — TELEPHONE ENCOUNTER
Pt asking for recommendations for her sleep. States she stays of all night and only sleeps for a couple hours and doesn't really nap during the day. She takes the gabapentin 300mg nightly.

## 2018-09-18 NOTE — TELEPHONE ENCOUNTER
Unless she has edmondbe w Ernesto rec change rx to say 3x300 hs but start by taking 2x300 for a week

## 2018-09-18 NOTE — TELEPHONE ENCOUNTER
----- Message from Cathi Doe sent at 9/18/2018  9:08 AM CDT -----  Contact: pt  Please call pt @ [phone] or [phone] pt states she is having trouble sleeping and always tired, pt need to know what to do, pt is concerned.

## 2018-09-24 ENCOUNTER — TELEPHONE (OUTPATIENT)
Dept: FAMILY MEDICINE | Facility: CLINIC | Age: 73
End: 2018-09-24

## 2018-09-24 NOTE — TELEPHONE ENCOUNTER
----- Message from Celina Goins sent at 9/24/2018  3:22 PM CDT -----  Contact: Pt   States she's calling rg wanting to discuss her gabapentin. States that it says it's not good for pt's with Alzheimer's and she has it and can be reached at [phone]//thanks/dbw

## 2018-09-24 NOTE — TELEPHONE ENCOUNTER
----- Message from Celina Goins sent at 9/24/2018  3:22 PM CDT -----  Contact: Pt   States she's calling rg wanting to discuss her gabapentin. States that it says it's not good for pt's with Alzheimer's and she has it and can be reached at [phone]//thanks/dbw

## 2018-09-25 ENCOUNTER — TELEPHONE (OUTPATIENT)
Dept: FAMILY MEDICINE | Facility: CLINIC | Age: 73
End: 2018-09-25

## 2018-09-25 NOTE — TELEPHONE ENCOUNTER
Pt states she has a family member that told her that gabapentin is not good for people in the 1st stages of alzheimer (which she is). She is using it for sleep, now taking 2 at night. States it is helping her with sleep and has not noticed any change in her memory. She wants to verify with you that it is ok to continue taking.

## 2018-09-25 NOTE — TELEPHONE ENCOUNTER
----- Message from Sophia Goodman sent at 9/25/2018  3:33 PM CDT -----  Contact: Pt.   Pt is retraining missed call. Pt states that call is regarding medication. .[phone] (home)   Or. [phone]

## 2018-09-30 RX ORDER — ALPRAZOLAM 0.25 MG/1
TABLET ORAL
Qty: 60 TABLET | Refills: 3 | Status: SHIPPED | OUTPATIENT
Start: 2018-09-30 | End: 2018-10-24

## 2018-10-19 RX ORDER — METFORMIN HYDROCHLORIDE 500 MG/1
TABLET ORAL
Qty: 120 TABLET | Refills: 12 | Status: SHIPPED | OUTPATIENT
Start: 2018-10-19 | End: 2018-10-24 | Stop reason: SDUPTHER

## 2018-10-24 ENCOUNTER — PATIENT OUTREACH (OUTPATIENT)
Dept: ADMINISTRATIVE | Facility: HOSPITAL | Age: 73
End: 2018-10-24

## 2018-10-24 RX ORDER — METFORMIN HYDROCHLORIDE 500 MG/1
TABLET ORAL
Qty: 360 TABLET | Refills: 4 | Status: SHIPPED | OUTPATIENT
Start: 2018-10-24 | End: 2019-03-14 | Stop reason: SDUPTHER

## 2018-10-24 RX ORDER — ALPRAZOLAM 0.25 MG/1
TABLET ORAL
Qty: 120 TABLET | Refills: 1 | OUTPATIENT
Start: 2018-10-24

## 2018-10-24 NOTE — TELEPHONE ENCOUNTER
Spoke with patient in reference to xanax refill, added med refill to 11/7/18 appointment.  Patient states she is completely out and has been taking 4 tablets daily as prescription says, wanted to know if you could prescribe enough until her appt, please advise

## 2018-10-24 NOTE — TELEPHONE ENCOUNTER
We can no longer refill requested sedative or benzodiazipine medication without OV - this will no longer be filled without patient being present  Please book the patient with me

## 2018-10-24 NOTE — LETTER
October 24, 2018        Cheryl Quiroga  [address] [facility] [address]      Dear Mrs. Quiroga,    You have an upcoming appointment with Augie Morris MD on 11/7/2018 @ 9:00 am.      Your chart is indicating you may be due for the following and I will be happy to assist you in scheduling any needed appointments:  Health Maintenance Due   Topic    Eye Exam     Lipid Panel     Foot Exam     Hemoglobin A1c     Influenza Vaccine      If you have had any of the above done at another facility, please bring the records or information with you so that your record at [facility] will be complete.    We will be happy to assist you with scheduling any necessary appointments or you may contact the [facility] appointment desk at [phone] to schedule at your convenience.     Thank you for choosing [facility] for your healthcare needs,      HEMANTH York  Care Coordination Department  [facility]  [phone]

## 2018-10-24 NOTE — TELEPHONE ENCOUNTER
----- Message from Dona Calvert sent at 10/24/2018  4:39 PM CDT -----  Contact: Pt   Pt called and stated she was returning a call to the nurse. She can be reached at  [phone]..    Thanks,  TF

## 2018-10-24 NOTE — TELEPHONE ENCOUNTER
Pt states the rx sent in was for only 60 pills but normally gets 120 cause she takes them 4 times a day. She has a follow up appt booked for next refills. Pt would like a call back if new rx is sent in. [phone]

## 2018-10-25 ENCOUNTER — LAB VISIT (OUTPATIENT)
Dept: LAB | Facility: HOSPITAL | Age: 73
End: 2018-10-25
Attending: FAMILY MEDICINE
Payer: MEDICARE

## 2018-10-25 ENCOUNTER — OFFICE VISIT (OUTPATIENT)
Dept: FAMILY MEDICINE | Facility: CLINIC | Age: 73
End: 2018-10-25
Payer: MEDICARE

## 2018-10-25 VITALS
DIASTOLIC BLOOD PRESSURE: 73 MMHG | BODY MASS INDEX: 28.86 KG/M2 | HEART RATE: 67 BPM | WEIGHT: 173.19 LBS | HEIGHT: 65 IN | SYSTOLIC BLOOD PRESSURE: 103 MMHG | TEMPERATURE: 98 F

## 2018-10-25 DIAGNOSIS — G31.84 MILD COGNITIVE IMPAIRMENT WITH MEMORY LOSS: ICD-10-CM

## 2018-10-25 DIAGNOSIS — E11.9 TYPE 2 DIABETES MELLITUS WITHOUT COMPLICATION, WITHOUT LONG-TERM CURRENT USE OF INSULIN: ICD-10-CM

## 2018-10-25 DIAGNOSIS — F41.9 ANXIETY: ICD-10-CM

## 2018-10-25 DIAGNOSIS — R68.89 COLD INTOLERANCE: ICD-10-CM

## 2018-10-25 DIAGNOSIS — I10 ESSENTIAL HYPERTENSION: ICD-10-CM

## 2018-10-25 DIAGNOSIS — G47.9 SLEEP DISORDER: Primary | ICD-10-CM

## 2018-10-25 PROBLEM — R10.9 ABDOMINAL PAIN: Status: RESOLVED | Noted: 2017-12-12 | Resolved: 2018-10-25

## 2018-10-25 LAB
ALBUMIN SERPL BCP-MCNC: 3.5 G/DL
ALP SERPL-CCNC: 73 U/L
ALT SERPL W/O P-5'-P-CCNC: 7 U/L
ANION GAP SERPL CALC-SCNC: 7 MMOL/L
AST SERPL-CCNC: 15 U/L
BASOPHILS # BLD AUTO: 0.02 K/UL
BASOPHILS NFR BLD: 0.4 %
BILIRUB SERPL-MCNC: 0.5 MG/DL
BUN SERPL-MCNC: 17 MG/DL
CALCIUM SERPL-MCNC: 9.7 MG/DL
CHLORIDE SERPL-SCNC: 103 MMOL/L
CO2 SERPL-SCNC: 29 MMOL/L
CREAT SERPL-MCNC: 0.9 MG/DL
DIFFERENTIAL METHOD: ABNORMAL
EOSINOPHIL # BLD AUTO: 0.2 K/UL
EOSINOPHIL NFR BLD: 3.5 %
ERYTHROCYTE [DISTWIDTH] IN BLOOD BY AUTOMATED COUNT: 15.2 %
EST. GFR  (AFRICAN AMERICAN): >60 ML/MIN/1.73 M^2
EST. GFR  (NON AFRICAN AMERICAN): >60 ML/MIN/1.73 M^2
ESTIMATED AVG GLUCOSE: 126 MG/DL
GLUCOSE SERPL-MCNC: 103 MG/DL
HBA1C MFR BLD HPLC: 6 %
HCT VFR BLD AUTO: 38.1 %
HGB BLD-MCNC: 11.8 G/DL
IMM GRANULOCYTES # BLD AUTO: 0.01 K/UL
IMM GRANULOCYTES NFR BLD AUTO: 0.2 %
LYMPHOCYTES # BLD AUTO: 3 K/UL
LYMPHOCYTES NFR BLD: 55 %
MCH RBC QN AUTO: 26.2 PG
MCHC RBC AUTO-ENTMCNC: 31 G/DL
MCV RBC AUTO: 85 FL
MONOCYTES # BLD AUTO: 0.4 K/UL
MONOCYTES NFR BLD: 7.5 %
NEUTROPHILS # BLD AUTO: 1.8 K/UL
NEUTROPHILS NFR BLD: 33.4 %
NRBC BLD-RTO: 0 /100 WBC
PLATELET # BLD AUTO: 318 K/UL
PMV BLD AUTO: 9.4 FL
POTASSIUM SERPL-SCNC: 4 MMOL/L
PROT SERPL-MCNC: 7.1 G/DL
RBC # BLD AUTO: 4.5 M/UL
SODIUM SERPL-SCNC: 139 MMOL/L
TSH SERPL DL<=0.005 MIU/L-ACNC: 0.59 UIU/ML
WBC # BLD AUTO: 5.45 K/UL

## 2018-10-25 PROCEDURE — 85025 COMPLETE CBC W/AUTO DIFF WBC: CPT

## 2018-10-25 PROCEDURE — 84443 ASSAY THYROID STIM HORMONE: CPT

## 2018-10-25 PROCEDURE — 99213 OFFICE O/P EST LOW 20 MIN: CPT | Mod: PBBFAC,PO | Performed by: FAMILY MEDICINE

## 2018-10-25 PROCEDURE — 99999 PR PBB SHADOW E&M-EST. PATIENT-LVL III: CPT | Mod: PBBFAC,,, | Performed by: FAMILY MEDICINE

## 2018-10-25 PROCEDURE — 36415 COLL VENOUS BLD VENIPUNCTURE: CPT | Mod: PO

## 2018-10-25 PROCEDURE — 99214 OFFICE O/P EST MOD 30 MIN: CPT | Mod: S$PBB,,, | Performed by: FAMILY MEDICINE

## 2018-10-25 PROCEDURE — 83036 HEMOGLOBIN GLYCOSYLATED A1C: CPT

## 2018-10-25 PROCEDURE — 80053 COMPREHEN METABOLIC PANEL: CPT

## 2018-10-25 RX ORDER — ALPRAZOLAM 0.25 MG/1
TABLET ORAL
Qty: 120 TABLET | Refills: 1 | OUTPATIENT
Start: 2018-10-25

## 2018-10-25 RX ORDER — ALPRAZOLAM 0.25 MG/1
TABLET ORAL
Qty: 120 TABLET | Refills: 5 | Status: SHIPPED | OUTPATIENT
Start: 2018-10-25 | End: 2019-01-30 | Stop reason: SDUPTHER

## 2018-10-25 NOTE — PROGRESS NOTES
The patient presents today to fu sleep issues, initiating well but wakes 2hrs later Gen doing well w xanax .25 but recently we reduced to bid   We rev her memory problems and rel to benzo but this seems to be independent of this usage. When she has incr anxiety her memory problems worsen but when taking xanax seem to improve.Also discussed incr fall risk-no hx of falls. Also fu DM prev well controlled   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    Migraines     Sciatica, left side 3/22/2017     Past Surgical History:   Procedure Laterality Date    ARTHROPLASTY-KNEE and femoral autologous bone grafting Left 8/14/2017    Performed by [facility]iroga [facility] at [facility] OR    COLONOSCOPY  2010    COLONOSCOPY  08/17/2015    Dr. Pimentel, repeat in 5 years    COLONOSCOPY N/A 8/17/2015    Per[facility] ENDO    ESOPHAGOGASTRODUODENOSCOPY (EGD) N/A 12/12/2017    [facility]    ESOPHAGOGASTRODUODENOSCOPY (EGD) N/A 8/17/2015    Performed by [facility] at [facility] ENDO    HYSTERECTOMY      ovaries removed    JOINT REPLACEMENT      right knee    KNEE SURGERY Bilateral 08/2017    UPPER GASTROINTESTINAL ENDOSCOPY  08/17/2015    Dr. Pimentel     Review of patient's allergies indicates:   Allergen Reactions    Codeine      Other reaction(s): Unknown    Iodine      Other reaction(s): Unknown     Current Outpatient Medications on File Prior to Visit   Medication Sig Dispense Refill    alprazolam (XANAX) 0.25 MG tablet  TAKE ONE TABLET FOUR TIMES DAILY (Patient taking differently: TAKE ONE TABLET FOUR TIMES DAILY  as needed) 120 tablet 1    benazepril-hydrochlorthiazide (LOTENSIN HCT) 10-12.5 mg Tab Take 1 tablet by mouth once daily. 90 tablet 4    donepezil (ARICEPT) 10 MG tablet Take 1 tablet (10 mg total) by mouth every evening. 30 tablet 11    fluticasone-salmeterol (ADVAIR DISKUS) 250-50 mcg/dose diskus inhaler Inhale 1 puff into the lungs 2 (two) times daily. Twice a day      gabapentin (NEURONTIN) 300 MG capsule Take 3 capsules (900 mg total) by mouth every evening. 90 capsule 11    metFORMIN (GLUCOPHAGE) 500 MG tablet TAKE TWO TABLETS BY MOUTH EVERY MORNING and TWO TABLETS EVERY EVENING 360 tablet 4    MULTIVITAMIN ORAL Take 1 tablet by mouth every morning. Every day      aspirin 325 MG tablet Take 1 tablet (325 mg total) by mouth 2 (two) times daily. 60 tablet 0    omeprazole (PRILOSEC) 40 MG capsule Take 1 capsule (40 mg total) by mouth 2 (two) times daily before meals. 30 capsule 1    ranitidine (ZANTAC) 300 MG tablet Take 1 tablet (300 mg total) by mouth 2 (two) times daily. 60 tablet 11     No current facility-administered medications on file prior to visit.      Social History     Socioeconomic History    Marital status:      Spouse name: Not on file    Number of children: Not on file    Years of education: Not on file    Highest education level: Not on file   Social Needs    Financial resource strain: Not on file    Food insecurity - worry: Not on file    Food insecurity - inability: Not on file    Transportation needs - medical: Not on file    Transportation needs - non-medical: Not on file   Occupational History    Not on file   Tobacco Use    Smoking status: Never Smoker    Smokeless tobacco: Never Used   Substance and Sexual Activity    Alcohol use: No     Alcohol/week: 0.0 oz    Drug use: No    Sexual activity: Yes     Partners: Male   Other Topics Concern    Not on file   Social  History Narrative    Not on file     Family History   Problem Relation Age of Onset    Heart disease Mother     Hypertension Father     Diabetes Father     Colon polyps Father     Colon cancer Neg Hx     Stomach cancer Neg Hx     Crohn's disease Neg Hx     Ulcerative colitis Neg Hx     Celiac disease Neg Hx          ROS:GENERAL: No fever, chills, fatigability or weight loss.  SKIN: No rashes, itching or changes in color or texture of skin.  HEAD: No headaches or recent head trauma.EYES: Visual acuity fine. No photophobia, ocular pain or diplopia.EARS: Denies ear pain, discharge or vertigo.NOSE: No loss of smell, no epistaxis or postnasal drip.MOUTH & THROAT: No hoarseness or change in voice. No excessive gum bleeding.NODES: Denies swollen glands.  CHEST: Denies GARCIA, cyanosis, wheezing, cough and sputum production.  CARDIOVASCULAR: Denies chest pain, PND, orthopnea or reduced exercise tolerance.  ABDOMEN: Appetite fine. No weight loss. Denies diarrhea, abdominal pain, hematemesis or blood in stool.  URINARY: No flank pain, dysuria or hematuria.  PERIPHERAL VASCULAR: No claudication or cyanosis.  MUSCULOSKELETAL: See above.  NEUROLOGIC: No history of seizures, paralysis, alteration of gait or coordination.  PE:   HEAD: Normocephalic, atraumatic.EYES: PERRL. EOMI.   EARS: TM's intact. Light reflex normal. No retraction or perforation.   NOSE: Mucosa pink. Airway clear.MOUTH & THROAT: No tonsillar enlargement. No pharyngeal erythema or exudate. No stridor.  NODES: No cervical, axillary or inguinal lymph node enlargement.  CHEST: Lungs clear to auscultation.  CARDIOVASCULAR: Normal S1, S2. No rubs, murmurs or gallops.  ABDOMEN: Bowel sounds normal. Not distended. Soft. No tenderness or masses.  MUSCULOSKELETAL: No palpable abnormality  NEUROLOGIC: Cranial Nerves: II-XII grossly intact.  Motor: 5/5 strength major flexors/extensors.  DTR's: Knees, Ankles 2+ and equal bilaterally; downgoing toes.  Sensory: Intact  to light touch distally.  Gait & Posture: Normal gait and fine motion. No cerebellar signs.     Impression: Cheryl was seen today for xanax prescription change.    Diagnoses and all orders for this visit:    Sleep disorder    Type 2 diabetes mellitus without complication, without long-term current use of insulin  -     Hemoglobin A1c; Future    Essential hypertension  -     Comprehensive metabolic panel; Future  -     CBC auto differential; Future    Mild cognitive impairment with memory loss    Anxiety    Cold intolerance  -     TSH; Future    Other orders  -     ALPRAZolam (XANAX) 0.25 MG tablet; TAKE ONE TABLET FOUR TIMES DAILY    Cold intolerance   Sleep disorder  Ur incont  Plan:See above discusion

## 2018-10-26 ENCOUNTER — LAB VISIT (OUTPATIENT)
Dept: LAB | Facility: HOSPITAL | Age: 73
End: 2018-10-26
Attending: FAMILY MEDICINE
Payer: MEDICARE

## 2018-10-26 DIAGNOSIS — E11.9 TYPE 2 DIABETES MELLITUS WITHOUT COMPLICATION: ICD-10-CM

## 2018-10-26 LAB
ESTIMATED AVG GLUCOSE: 126 MG/DL
HBA1C MFR BLD HPLC: 6 %

## 2018-10-26 PROCEDURE — 36415 COLL VENOUS BLD VENIPUNCTURE: CPT | Mod: PO

## 2018-10-26 PROCEDURE — 83036 HEMOGLOBIN GLYCOSYLATED A1C: CPT

## 2019-01-11 ENCOUNTER — TELEPHONE (OUTPATIENT)
Dept: FAMILY MEDICINE | Facility: CLINIC | Age: 74
End: 2019-01-11

## 2019-01-11 RX ORDER — ENALAPRIL MALEATE AND HYDROCHLOROTHIAZIDE 10; 25 MG/1; MG/1
1 TABLET ORAL DAILY
Qty: 90 TABLET | Refills: 3 | Status: SHIPPED | OUTPATIENT
Start: 2019-01-11 | End: 2019-11-01 | Stop reason: SDUPTHER

## 2019-01-11 NOTE — TELEPHONE ENCOUNTER
----- Message from Emma Mchugh sent at 1/11/2019 10:31 AM CST -----  Contact: pt  Pt stated she received a notice to change benazepril-hydrochlorthiazide (LOTENSIN HCT) 10-12.5 mg Tab  Medication insurance no longer cover call back:[phone]       [facility] [place] - [place]ITO Pettit - [address]  [address] [facility]  [place] [address]  Phone: [phone] Fax: [phone]

## 2019-01-16 ENCOUNTER — PATIENT OUTREACH (OUTPATIENT)
Dept: ADMINISTRATIVE | Facility: HOSPITAL | Age: 74
End: 2019-01-16

## 2019-01-16 NOTE — LETTER
January 16, 2019        Cheryl Quiroga  [address] [place] [address]      Dear Mrs. Quiroga,    You have an upcoming appointment with Augie Morris MD on 1/30/19 @ 10:00 am.      Your chart is indicating you may be due for the following and I will be happy to assist you in scheduling any needed appointments:  Health Maintenance Due   Topic    Eye Exam     Lipid Panel     Foot Exam      If you have had any of the above done at another facility, please bring the records or information with you so that your record at [facility] will be complete.    We will be happy to assist you with scheduling any necessary appointments or you may contact the [facility] appointment desk at [phone] to schedule at your convenience.     Thank you for choosing [facility] for your healthcare needs,      HEMANTH York  Care Coordination Department  [facility]  [phone]

## 2019-01-30 ENCOUNTER — OFFICE VISIT (OUTPATIENT)
Dept: FAMILY MEDICINE | Facility: CLINIC | Age: 74
End: 2019-01-30
Payer: MEDICARE

## 2019-01-30 VITALS — WEIGHT: 174 LBS | BODY MASS INDEX: 28.96 KG/M2

## 2019-01-30 DIAGNOSIS — K21.9 GASTROESOPHAGEAL REFLUX DISEASE, ESOPHAGITIS PRESENCE NOT SPECIFIED: ICD-10-CM

## 2019-01-30 DIAGNOSIS — G31.84 MILD COGNITIVE IMPAIRMENT WITH MEMORY LOSS: ICD-10-CM

## 2019-01-30 DIAGNOSIS — Q80.9 XERODERMA: Primary | ICD-10-CM

## 2019-01-30 DIAGNOSIS — I10 ESSENTIAL HYPERTENSION: ICD-10-CM

## 2019-01-30 DIAGNOSIS — E11.9 TYPE 2 DIABETES MELLITUS WITHOUT COMPLICATION, WITHOUT LONG-TERM CURRENT USE OF INSULIN: ICD-10-CM

## 2019-01-30 DIAGNOSIS — R25.2 LEG CRAMPS: ICD-10-CM

## 2019-01-30 DIAGNOSIS — G47.9 SLEEP DISORDER: ICD-10-CM

## 2019-01-30 PROBLEM — F41.9 ANXIETY: Status: RESOLVED | Noted: 2018-10-25 | Resolved: 2019-01-30

## 2019-01-30 PROBLEM — R63.4 WEIGHT LOSS: Status: RESOLVED | Noted: 2018-01-24 | Resolved: 2019-01-30

## 2019-01-30 PROCEDURE — 99212 OFFICE O/P EST SF 10 MIN: CPT | Mod: PBBFAC,PO | Performed by: FAMILY MEDICINE

## 2019-01-30 PROCEDURE — 99999 PR PBB SHADOW E&M-EST. PATIENT-LVL II: ICD-10-PCS | Mod: PBBFAC,,, | Performed by: FAMILY MEDICINE

## 2019-01-30 PROCEDURE — 99999 PR PBB SHADOW E&M-EST. PATIENT-LVL II: CPT | Mod: PBBFAC,,, | Performed by: FAMILY MEDICINE

## 2019-01-30 PROCEDURE — 99214 PR OFFICE/OUTPT VISIT, EST, LEVL IV, 30-39 MIN: ICD-10-PCS | Mod: S$PBB,,, | Performed by: FAMILY MEDICINE

## 2019-01-30 PROCEDURE — 99214 OFFICE O/P EST MOD 30 MIN: CPT | Mod: S$PBB,,, | Performed by: FAMILY MEDICINE

## 2019-01-30 RX ORDER — MONTELUKAST SODIUM 10 MG/1
10 TABLET ORAL NIGHTLY
Qty: 30 TABLET | Refills: 0 | Status: SHIPPED | OUTPATIENT
Start: 2019-01-30 | End: 2019-03-01

## 2019-01-30 RX ORDER — ALPRAZOLAM 0.25 MG/1
TABLET ORAL
Qty: 120 TABLET | Refills: 5 | Status: SHIPPED | OUTPATIENT
Start: 2019-01-30 | End: 2019-07-23 | Stop reason: SDUPTHER

## 2019-01-30 NOTE — PROGRESS NOTES
The patient presents today to fu sig gen dry itchy skin Recent rx lotion hurts skin and top steroid not helping. Also co nocturnal leg cramps Also co anxiety. Gen doing well w xanax .25 but recently we reduced to bid.   We rev her memory problems and rel to benzo but this seems to be independent of this usage. When she has incr anxiety her memory problems worsen but when taking xanax seem to improve.Also discussed incr fall risk-no hx of falls. 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 Pimentel, repeat in 5 years    COLONOSCOPY N/A 8/17/2015    Performed by [facility] ENDO    ESOPHAGOGASTRODUODENOSCOPY (EGD) N/A 12/12/2017    [facility]    ESOPHAGOGASTRODUODENOSCOPY (EGD) N/A 8/17/2015    Performed by [facility] at [facility] ENDO    HYSTERECTOMY      ovaries removed    JOINT REPLACEMENT      right knee    KNEE SURGERY Bilateral 08/2017    UPPER GASTROINTESTINAL ENDOSCOPY  08/17/2015    Dr. Pimentel     Review of patient's allergies indicates:   Allergen Reactions    Codeine      Other reaction(s): Unknown    Iodine      Other reaction(s): Unknown     Current Outpatient Medications on File Prior to  Visit   Medication Sig Dispense Refill    ALPRAZolam (XANAX) 0.25 MG tablet TAKE ONE TABLET FOUR TIMES DAILY 120 tablet 5    enalapril-hydrochlorothiazide (VASERETIC) 10-25 mg per tablet Take 1 tablet by mouth once daily. 90 tablet 3    fluticasone-salmeterol (ADVAIR DISKUS) 250-50 mcg/dose diskus inhaler Inhale 1 puff into the lungs 2 (two) times daily. Twice a day      gabapentin (NEURONTIN) 300 MG capsule Take 3 capsules (900 mg total) by mouth every evening. 90 capsule 11    metFORMIN (GLUCOPHAGE) 500 MG tablet TAKE TWO TABLETS BY MOUTH EVERY MORNING and TWO TABLETS EVERY EVENING 360 tablet 4    MULTIVITAMIN ORAL Take 1 tablet by mouth every morning. Every day      aspirin 325 MG tablet Take 1 tablet (325 mg total) by mouth 2 (two) times daily. 60 tablet 0    donepezil (ARICEPT) 10 MG tablet Take 1 tablet (10 mg total) by mouth every evening. 30 tablet 11    omeprazole (PRILOSEC) 40 MG capsule Take 1 capsule (40 mg total) by mouth 2 (two) times daily before meals. 30 capsule 1    ranitidine (ZANTAC) 300 MG tablet Take 1 tablet (300 mg total) by mouth 2 (two) times daily. 60 tablet 11    [DISCONTINUED] benazepril-hydrochlorthiazide (LOTENSIN HCT) 10-12.5 mg Tab Take 1 tablet by mouth once daily. 90 tablet 4     No current facility-administered medications on file prior to visit.      Social History     Socioeconomic History    Marital status:      Spouse name: Not on file    Number of children: Not on file    Years of education: Not on file    Highest education level: Not on file   Social Needs    Financial resource strain: Not on file    Food insecurity - worry: Not on file    Food insecurity - inability: Not on file    Transportation needs - medical: Not on file    Transportation needs - non-medical: Not on file   Occupational History    Not on file   Tobacco Use    Smoking status: Never Smoker    Smokeless tobacco: Never Used   Substance and Sexual Activity    Alcohol use: No      Alcohol/week: 0.0 oz    Drug use: No    Sexual activity: Yes     Partners: Male   Other Topics Concern    Not on file   Social History Narrative    Not on file     Family History   Problem Relation Age of Onset    Heart disease Mother     Hypertension Father     Diabetes Father     Colon polyps Father     Colon cancer Neg Hx     Stomach cancer Neg Hx     Crohn's disease Neg Hx     Ulcerative colitis Neg Hx     Celiac disease Neg Hx          ROS:GENERAL: No fever, chills, fatigability or weight loss.  SKIN: No rashes, itching or changes in color or texture of skin.  HEAD: No headaches or recent head trauma.EYES: Visual acuity fine. No photophobia, ocular pain or diplopia.EARS: Denies ear pain, discharge or vertigo.NOSE: No loss of smell, no epistaxis or postnasal drip.MOUTH & THROAT: No hoarseness or change in voice. No excessive gum bleeding.NODES: Denies swollen glands.  CHEST: Denies GARCIA, cyanosis, wheezing, cough and sputum production.  CARDIOVASCULAR: Denies chest pain, PND, orthopnea or reduced exercise tolerance.  ABDOMEN: Appetite fine. No weight loss. Denies diarrhea, abdominal pain, hematemesis or blood in stool.  URINARY: No flank pain, dysuria or hematuria.  PERIPHERAL VASCULAR: No claudication or cyanosis.  MUSCULOSKELETAL: See above.  NEUROLOGIC: No history of seizures, paralysis, alteration of gait or coordination.  PE:   HEAD: Normocephalic, atraumatic.EYES: PERRL. EOMI.   EARS: TM's intact. Light reflex normal. No retraction or perforation.   NOSE: Mucosa pink. Airway clear.MOUTH & THROAT: No tonsillar enlargement. No pharyngeal erythema or exudate. No stridor.  NODES: No cervical, axillary or inguinal lymph node enlargement.  CHEST: Lungs clear to auscultation.  CARDIOVASCULAR: Normal S1, S2. No rubs, murmurs or gallops.  ABDOMEN: Bowel sounds normal. Not distended. Soft. No tenderness or masses.  MUSCULOSKELETAL: No palpable abnormality  NEUROLOGIC: Cranial Nerves: II-XII grossly  intact.  Motor: 5/5 strength major flexors/extensors.  DTR's: Knees, Ankles 2+ and equal bilaterally; downgoing toes.  Sensory: Intact to light touch distally.  Gait & Posture: Normal gait and fine motion. No cerebellar signs.   Skin Dry scaley   Impression: Icthyosis  Sleep disorder  Noct leg cramps   DM a1c 6.0 11/18   Ur incont  Diagnoses and all orders for this visit:    Xeroderma    Leg cramps    Sleep disorder    Gastroesophageal reflux disease, esophagitis presence not specified    Type 2 diabetes mellitus without complication, without long-term current use of insulin    Essential hypertension    Mild cognitive impairment with memory loss    Other orders  -     ALPRAZolam (XANAX) 0.25 MG tablet; TAKE ONE TABLET FOUR TIMES DAILY  -     montelukast (SINGULAIR) 10 mg tablet; Take 1 tablet (10 mg total) by mouth every evening.  -     ranitidine (ZANTAC) 300 MG tablet; Take 1 tablet (300 mg total) by mouth 2 (two) times daily.      Plan:Rev hydration  Moisterization  Ranitadine  Montelukast   If leg cramps persist rec add Diltiazem

## 2019-03-14 RX ORDER — METFORMIN HYDROCHLORIDE 500 MG/1
TABLET ORAL
Qty: 360 TABLET | Refills: 4 | Status: SHIPPED | OUTPATIENT
Start: 2019-03-14 | End: 2019-11-12

## 2019-04-05 DIAGNOSIS — E11.9 TYPE 2 DIABETES MELLITUS WITHOUT COMPLICATION: ICD-10-CM

## 2019-05-10 ENCOUNTER — PATIENT OUTREACH (OUTPATIENT)
Dept: ADMINISTRATIVE | Facility: HOSPITAL | Age: 74
End: 2019-05-10

## 2019-05-10 DIAGNOSIS — E11.9 TYPE 2 DIABETES MELLITUS WITHOUT COMPLICATION, UNSPECIFIED WHETHER LONG TERM INSULIN USE: Primary | ICD-10-CM

## 2019-05-10 NOTE — PROGRESS NOTES
Spoke with pt concerning DM labs and follow up.  Pt is scheduled with Dr. Morris 7/23/19 for med refill.  Pt scheduled labs for 7/18/2019 for DM labs. a1c and urine micro ordered and linked to labs.  Appointment letter sent.

## 2019-07-18 ENCOUNTER — LAB VISIT (OUTPATIENT)
Dept: LAB | Facility: HOSPITAL | Age: 74
End: 2019-07-18
Attending: FAMILY MEDICINE
Payer: MEDICARE

## 2019-07-18 DIAGNOSIS — E11.9 TYPE 2 DIABETES MELLITUS WITHOUT COMPLICATION, UNSPECIFIED WHETHER LONG TERM INSULIN USE: ICD-10-CM

## 2019-07-18 DIAGNOSIS — E11.9 TYPE 2 DIABETES MELLITUS WITHOUT COMPLICATION: ICD-10-CM

## 2019-07-18 LAB
ALBUMIN/CREAT UR: 3.6 UG/MG (ref 0–30)
CHOLEST SERPL-MCNC: 193 MG/DL (ref 120–199)
CHOLEST/HDLC SERPL: 3.7 {RATIO} (ref 2–5)
CREAT UR-MCNC: 137 MG/DL (ref 15–325)
ESTIMATED AVG GLUCOSE: 131 MG/DL (ref 68–131)
HBA1C MFR BLD HPLC: 6.2 % (ref 4–5.6)
HDLC SERPL-MCNC: 52 MG/DL (ref 40–75)
HDLC SERPL: 26.9 % (ref 20–50)
LDLC SERPL CALC-MCNC: 121.2 MG/DL (ref 63–159)
MICROALBUMIN UR DL<=1MG/L-MCNC: 5 UG/ML
NONHDLC SERPL-MCNC: 141 MG/DL
TRIGL SERPL-MCNC: 99 MG/DL (ref 30–150)

## 2019-07-18 PROCEDURE — 80061 LIPID PANEL: CPT

## 2019-07-18 PROCEDURE — 83036 HEMOGLOBIN GLYCOSYLATED A1C: CPT

## 2019-07-18 PROCEDURE — 36415 COLL VENOUS BLD VENIPUNCTURE: CPT | Mod: PO

## 2019-07-18 PROCEDURE — 82043 UR ALBUMIN QUANTITATIVE: CPT

## 2019-07-22 DIAGNOSIS — E11.9 TYPE 2 DIABETES MELLITUS WITHOUT COMPLICATION, UNSPECIFIED WHETHER LONG TERM INSULIN USE: Primary | ICD-10-CM

## 2019-07-23 ENCOUNTER — OFFICE VISIT (OUTPATIENT)
Dept: FAMILY MEDICINE | Facility: CLINIC | Age: 74
End: 2019-07-23
Payer: MEDICARE

## 2019-07-23 ENCOUNTER — HOSPITAL ENCOUNTER (OUTPATIENT)
Dept: RADIOLOGY | Facility: HOSPITAL | Age: 74
Discharge: HOME | End: 2019-07-23
Attending: FAMILY MEDICINE
Payer: MEDICARE

## 2019-07-23 VITALS — WEIGHT: 175.69 LBS | HEIGHT: 65 IN | BODY MASS INDEX: 29.27 KG/M2

## 2019-07-23 VITALS
SYSTOLIC BLOOD PRESSURE: 127 MMHG | BODY MASS INDEX: 29.29 KG/M2 | HEART RATE: 66 BPM | HEIGHT: 65 IN | TEMPERATURE: 99 F | WEIGHT: 175.81 LBS | DIASTOLIC BLOOD PRESSURE: 71 MMHG

## 2019-07-23 DIAGNOSIS — I10 ESSENTIAL HYPERTENSION: ICD-10-CM

## 2019-07-23 DIAGNOSIS — J45.909 ASTHMA, UNSPECIFIED ASTHMA SEVERITY, UNSPECIFIED WHETHER COMPLICATED, UNSPECIFIED WHETHER PERSISTENT: ICD-10-CM

## 2019-07-23 DIAGNOSIS — Z12.31 SCREENING MAMMOGRAM, ENCOUNTER FOR: ICD-10-CM

## 2019-07-23 DIAGNOSIS — E11.9 TYPE 2 DIABETES MELLITUS WITHOUT COMPLICATION, UNSPECIFIED WHETHER LONG TERM INSULIN USE: Primary | ICD-10-CM

## 2019-07-23 DIAGNOSIS — F41.9 ANXIETY: ICD-10-CM

## 2019-07-23 PROCEDURE — 99999 PR PBB SHADOW E&M-EST. PATIENT-LVL III: ICD-10-PCS | Mod: PBBFAC,,, | Performed by: FAMILY MEDICINE

## 2019-07-23 PROCEDURE — 99213 OFFICE O/P EST LOW 20 MIN: CPT | Mod: PBBFAC,PO | Performed by: FAMILY MEDICINE

## 2019-07-23 PROCEDURE — 99214 PR OFFICE/OUTPT VISIT, EST, LEVL IV, 30-39 MIN: ICD-10-PCS | Mod: S$PBB,,, | Performed by: FAMILY MEDICINE

## 2019-07-23 PROCEDURE — 77063 MAMMO DIGITAL SCREENING BILAT WITH TOMOSYNTHESIS_CAD: ICD-10-PCS | Mod: 26,,, | Performed by: RADIOLOGY

## 2019-07-23 PROCEDURE — 99999 PR PBB SHADOW E&M-EST. PATIENT-LVL III: CPT | Mod: PBBFAC,,, | Performed by: FAMILY MEDICINE

## 2019-07-23 PROCEDURE — 99214 OFFICE O/P EST MOD 30 MIN: CPT | Mod: S$PBB,,, | Performed by: FAMILY MEDICINE

## 2019-07-23 PROCEDURE — 77067 MAMMO DIGITAL SCREENING BILAT WITH TOMOSYNTHESIS_CAD: ICD-10-PCS | Mod: 26,,, | Performed by: RADIOLOGY

## 2019-07-23 PROCEDURE — 77067 SCR MAMMO BI INCL CAD: CPT | Mod: 26,,, | Performed by: RADIOLOGY

## 2019-07-23 PROCEDURE — 77067 SCR MAMMO BI INCL CAD: CPT | Mod: TC,PO

## 2019-07-23 PROCEDURE — 77063 BREAST TOMOSYNTHESIS BI: CPT | Mod: 26,,, | Performed by: RADIOLOGY

## 2019-07-23 RX ORDER — ALBUTEROL SULFATE 90 UG/1
AEROSOL, METERED RESPIRATORY (INHALATION)
COMMUNITY
End: 2020-07-13

## 2019-07-23 RX ORDER — ALPRAZOLAM 0.25 MG/1
TABLET ORAL
Qty: 120 TABLET | Refills: 5 | Status: SHIPPED | OUTPATIENT
Start: 2019-07-23 | End: 2019-11-12

## 2019-07-23 RX ORDER — SIMVASTATIN 10 MG/1
10 TABLET, FILM COATED ORAL NIGHTLY
Qty: 90 TABLET | Refills: 3 | Status: SHIPPED | OUTPATIENT
Start: 2019-07-23 | End: 2020-07-22

## 2019-07-23 NOTE — PROGRESS NOTES
The patient presents today to  DM stable a1c 6.2 MAB neg Lipids good but see below -rec low dose statin  Asthma controlled. 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 Pimentel, repeat in 5 years    COLONOSCOPY N/A 8/17/2015    Performed by [facility] ENDO    ESOPHAGOGASTRODUODENOSCOPY (EGD) N/A 12/12/2017    Per[facility] ENDO    ESOPHAGOGASTRODUODENOSCOPY (EGD) N/A 8/17/2015    Performed by [facility]erto [facility]el MD at [facility] ENDO    HYSTERECTOMY      ovaries removed    JOINT REPLACEMENT      right knee    KNEE SURGERY Bilateral 08/2017    UPPER GASTROINTESTINAL ENDOSCOPY  08/17/2015    Dr. Pimentel     Review of patient's allergies indicates:   Allergen Reactions    Codeine      Other reaction(s): Unknown    Iodine      Other reaction(s): Unknown     Current Outpatient Medications on File Prior to Visit   Medication Sig Dispense Refill    albuterol (VENTOLIN HFA) 90 mcg/actuation inhaler Ventolin HFA 90 mcg/actuation aerosol inhaler   Inhale 2 puffs every 4 hours by inhalation route.      ALPRAZolam (XANAX) 0.25 MG tablet TAKE ONE TABLET FOUR TIMES DAILY 120 tablet 5    aspirin 325 MG  tablet Take 1 tablet (325 mg total) by mouth 2 (two) times daily. 60 tablet 0    donepezil (ARICEPT) 10 MG tablet Take 1 tablet (10 mg total) by mouth every evening. 30 tablet 11    enalapril-hydrochlorothiazide (VASERETIC) 10-25 mg per tablet Take 1 tablet by mouth once daily. 90 tablet 3    fluticasone-salmeterol (ADVAIR DISKUS) 250-50 mcg/dose diskus inhaler Inhale 1 puff into the lungs 2 (two) times daily. Twice a day      gabapentin (NEURONTIN) 300 MG capsule Take 3 capsules (900 mg total) by mouth every evening. 90 capsule 11    metFORMIN (GLUCOPHAGE) 500 MG tablet TAKE TWO TABLETS BY MOUTH EVERY MORNING and TWO TABLETS EVERY EVENING 360 tablet 4    MULTIVITAMIN ORAL Take 1 tablet by mouth every morning. Every day      omeprazole (PRILOSEC) 40 MG capsule Take 1 capsule (40 mg total) by mouth 2 (two) times daily before meals. 30 capsule 1    ranitidine (ZANTAC) 300 MG tablet Take 1 tablet (300 mg total) by mouth 2 (two) times daily. 60 tablet 11     No current facility-administered medications on file prior to visit.      Social History     Socioeconomic History    Marital status:      Spouse name: Not on file    Number of children: Not on file    Years of education: Not on file    Highest education level: Not on file   Occupational History    Not on file   Social Needs    Financial resource strain: Not on file    Food insecurity:     Worry: Not on file     Inability: Not on file    Transportation needs:     Medical: Not on file     Non-medical: Not on file   Tobacco Use    Smoking status: Never Smoker    Smokeless tobacco: Never Used   Substance and Sexual Activity    Alcohol use: No     Alcohol/week: 0.0 oz    Drug use: No    Sexual activity: Yes     Partners: Male   Lifestyle    Physical activity:     Days per week: Not on file     Minutes per session: Not on file    Stress: Not on file   Relationships    Social connections:     Talks on phone: Not on file     Gets together:  Not on file     Attends L[employer] service: Not on file     Active member of club or organization: Not on file     Attends meetings of clubs or organizations: Not on file     Relationship status: Not on file   Other Topics Concern    Not on file   Social History Narrative    Not on file     Family History   Problem Relation Age of Onset    Heart disease Mother     Hypertension Father     Diabetes Father     Colon polyps Father     Colon cancer Neg Hx     Stomach cancer Neg Hx     Crohn's disease Neg Hx     Ulcerative colitis Neg Hx     Celiac disease Neg Hx          ROS:GENERAL: No fever, chills, fatigability or weight loss.  SKIN: No rashes, itching or changes in color or texture of skin.  HEAD: No headaches or recent head trauma.EYES: Visual acuity fine. No photophobia, ocular pain or diplopia.EARS: Denies ear pain, discharge or vertigo.NOSE: No loss of smell, no epistaxis or postnasal drip.MOUTH & THROAT: No hoarseness or change in voice. No excessive gum bleeding.NODES: Denies swollen glands.  CHEST: Denies GARCIA, cyanosis, wheezing, cough and sputum production.  CARDIOVASCULAR: Denies chest pain, PND, orthopnea or reduced exercise tolerance.  ABDOMEN: Appetite fine. No weight loss. Denies diarrhea, abdominal pain, hematemesis or blood in stool.  URINARY: No flank pain, dysuria or hematuria.  PERIPHERAL VASCULAR: No claudication or cyanosis.  MUSCULOSKELETAL: See above.  NEUROLOGIC: No history of seizures, paralysis, alteration of gait or coordination.  PE:    HEAD: Normocephalic, atraumatic.EYES: PERRL. EOMI.   EARS: TM's intact. Light reflex normal. No retraction or perforation.   NOSE: Mucosa pink. Airway clear.MOUTH & THROAT: No tonsillar enlargement. No pharyngeal erythema or exudate. No stridor.  NODES: No cervical, axillary or inguinal lymph node enlargement.  CHEST: Lungs clear to auscultation.  CARDIOVASCULAR: Normal S1, S2. No rubs, murmurs or gallops.  ABDOMEN: Bowel sounds normal. Not  distended. Soft. No tenderness or masses.  MUSCULOSKELETAL: No palpable abnormality  NEUROLOGIC: Cranial Nerves: II-XII grossly intact.  Motor: 5/5 strength major flexors/extensors.  DTR's: Knees, Ankles 2+ and equal bilaterally; downgoing toes.  Sensory: Intact to light touch distally.  Gait & Posture: Normal gait and fine motion. No cerebellar signs.   Protective Sensation (w/ 10 gram monofilament): Intact  Visual Inspection (Evidence of Foot Deformity, Ulceration, Nails Intact, Skin Intact):Normal  Pedal Pulses: Present    Impression: DM  HLP  Asthma  Anxiety   Plan:Lab eval rev  Rec diet and ex recs  Rev risk and rec low dose statin -simvastatin 10 qd   Rev age appropriate screenings    Health Maintenance Due   Topic Date Due    Eye Exam  07/08/2017    Foot Exam  06/13/2018    Mammogram  05/10/2019    DEXA SCAN  08/01/2019

## 2019-10-01 RX ORDER — GABAPENTIN 300 MG/1
CAPSULE ORAL
Qty: 90 CAPSULE | Refills: 11 | Status: SHIPPED | OUTPATIENT
Start: 2019-10-01 | End: 2019-10-02 | Stop reason: SDUPTHER

## 2019-10-02 RX ORDER — GABAPENTIN 300 MG/1
900 CAPSULE ORAL NIGHTLY
Qty: 90 CAPSULE | Refills: 11 | Status: SHIPPED | OUTPATIENT
Start: 2019-10-02

## 2019-10-02 NOTE — TELEPHONE ENCOUNTER
----- Message from Gilbert Mitchell sent at 10/2/2019 10:11 AM CDT -----  Contact: PT  Type:  RX Refill Request    Who Called: Pt  Refill or New Rx:Refill  RX Name and Strength:Gabapentin 300mg  How is the patient currently taking it? (ex. 1XDay): 3 times daily  Is this a 30 day or 90 day RX:90  Preferred Pharmacy with phone number:  [facility] [place] [address]  [address] [facility] [address]  Phone: [phone] Fax: [phone]  Local or Mail Order:Local  Ordering Provider:Dr. Morris  Would the patient rather a call back or a response via [facility]? Call back  Best Call Back Number: [phone] (cell)  Additional Information: n/a

## 2019-10-29 ENCOUNTER — PATIENT OUTREACH (OUTPATIENT)
Dept: ADMINISTRATIVE | Facility: HOSPITAL | Age: 74
End: 2019-10-29

## 2019-10-29 NOTE — PROGRESS NOTES
Spoke with pt concerning overdue eye exam.  Pt stated she hasn't had her eye exam.  Offer to call [facility] to schedule and pt agreed.  Pt schedule with Dr. Isaac11/14/19 at 1:50 with the assistance of Giulia at the clinic.  Pt verbalized understanding of appt. Dexa scan order pended.

## 2019-11-01 RX ORDER — ENALAPRIL MALEATE AND HYDROCHLOROTHIAZIDE 10; 25 MG/1; MG/1
1 TABLET ORAL DAILY
Qty: 90 TABLET | Refills: 3 | Status: SHIPPED | OUTPATIENT
Start: 2019-11-01 | End: 2019-11-12

## 2019-11-12 ENCOUNTER — OFFICE VISIT (OUTPATIENT)
Dept: FAMILY MEDICINE | Facility: CLINIC | Age: 74
End: 2019-11-12
Payer: MEDICARE

## 2019-11-12 VITALS
HEART RATE: 52 BPM | DIASTOLIC BLOOD PRESSURE: 67 MMHG | WEIGHT: 176 LBS | BODY MASS INDEX: 29.32 KG/M2 | TEMPERATURE: 98 F | HEIGHT: 65 IN | SYSTOLIC BLOOD PRESSURE: 135 MMHG

## 2019-11-12 DIAGNOSIS — G31.84 MILD COGNITIVE IMPAIRMENT WITH MEMORY LOSS: ICD-10-CM

## 2019-11-12 DIAGNOSIS — E11.9 TYPE 2 DIABETES MELLITUS WITHOUT COMPLICATION, UNSPECIFIED WHETHER LONG TERM INSULIN USE: Primary | ICD-10-CM

## 2019-11-12 DIAGNOSIS — I10 BENIGN ESSENTIAL HTN: ICD-10-CM

## 2019-11-12 PROCEDURE — 99214 OFFICE O/P EST MOD 30 MIN: CPT | Mod: S$PBB,,, | Performed by: FAMILY MEDICINE

## 2019-11-12 PROCEDURE — 99214 PR OFFICE/OUTPT VISIT, EST, LEVL IV, 30-39 MIN: ICD-10-PCS | Mod: S$PBB,,, | Performed by: FAMILY MEDICINE

## 2019-11-12 PROCEDURE — 99213 OFFICE O/P EST LOW 20 MIN: CPT | Mod: PBBFAC,PO | Performed by: FAMILY MEDICINE

## 2019-11-12 PROCEDURE — 99999 PR PBB SHADOW E&M-EST. PATIENT-LVL III: ICD-10-PCS | Mod: PBBFAC,,, | Performed by: FAMILY MEDICINE

## 2019-11-12 PROCEDURE — 99999 PR PBB SHADOW E&M-EST. PATIENT-LVL III: CPT | Mod: PBBFAC,,, | Performed by: FAMILY MEDICINE

## 2019-11-12 RX ORDER — DONEPEZIL HYDROCHLORIDE 10 MG/1
10 TABLET, FILM COATED ORAL NIGHTLY
Qty: 90 TABLET | Refills: 4 | Status: SHIPPED | OUTPATIENT
Start: 2019-11-12 | End: 2020-11-11

## 2019-11-12 RX ORDER — ALPRAZOLAM 0.25 MG/1
TABLET ORAL
Qty: 120 TABLET | Refills: 0 | Status: SHIPPED | OUTPATIENT
Start: 2019-11-12 | End: 2020-01-07 | Stop reason: SDUPTHER

## 2019-11-12 RX ORDER — METFORMIN HYDROCHLORIDE 500 MG/1
TABLET ORAL
Qty: 270 TABLET | Refills: 4 | Status: SHIPPED | OUTPATIENT
Start: 2019-11-12

## 2019-11-12 RX ORDER — MONTELUKAST SODIUM 10 MG/1
10 TABLET ORAL NIGHTLY
Qty: 90 TABLET | Refills: 3 | Status: SHIPPED | OUTPATIENT
Start: 2019-11-12 | End: 2019-12-12

## 2019-11-12 RX ORDER — BENAZEPRIL HYDROCHLORIDE 5 MG/1
5 TABLET ORAL DAILY
Qty: 90 TABLET | Refills: 3 | Status: SHIPPED | OUTPATIENT
Start: 2019-11-12 | End: 2020-11-11

## 2019-11-12 RX ORDER — BENAZEPRIL HYDROCHLORIDE 10 MG/1
TABLET ORAL
COMMUNITY
End: 2019-11-12

## 2019-11-12 NOTE — PROGRESS NOTES
The patient presents today to  DM stable a1c 6.2 but having often hypoglycemia. Also has hypotension orthostatic. Asthma controlled. Anxiety controlled w xanax see below    Past Medical History:  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    Migraines     Sciatica, left side 3/22/2017     Past Surgical History:   Procedure Laterality Date    COLONOSCOPY  2010    COLONOSCOPY  08/17/2015    Dr. Pimentel, repeat in 5 years    HYSTERECTOMY      ovaries removed    JOINT REPLACEMENT      right knee    KNEE SURGERY Bilateral 08/2017    OOPHORECTOMY      UPPER GASTROINTESTINAL ENDOSCOPY  08/17/2015    Dr. Pimentel     Review of patient's allergies indicates:   Allergen Reactions    Codeine      Other reaction(s): Unknown    Iodine      Other reaction(s): Unknown     Current Outpatient Medications on File Prior to Visit   Medication Sig Dispense Refill    albuterol (VENTOLIN HFA) 90 mcg/actuation inhaler Ventolin HFA 90 mcg/actuation aerosol inhaler   Inhale 2 puffs every 4 hours by inhalation route.      ALPRAZolam (XANAX) 0.25 MG tablet TAKE ONE TABLET FOUR TIMES DAILY 120 tablet 5    aspirin 325 MG tablet Take 1 tablet (325 mg total) by mouth 2 (two) times daily. 60 tablet 0    donepezil (ARICEPT) 10 MG tablet Take 1 tablet (10 mg total) by mouth every evening. 30 tablet 11    enalapril-hydrochlorothiazide (VASERETIC) 10-25 mg per tablet Take 1 tablet by mouth once daily. 90 tablet 3    fluticasone-salmeterol (ADVAIR DISKUS) 250-50 mcg/dose diskus inhaler Inhale 1  puff into the lungs 2 (two) times daily. Twice a day      gabapentin (NEURONTIN) 300 MG capsule Take 3 capsules (900 mg total) by mouth every evening. 90 capsule 11    metFORMIN (GLUCOPHAGE) 500 MG tablet TAKE TWO TABLETS BY MOUTH EVERY MORNING and TWO TABLETS EVERY EVENING 360 tablet 4    MULTIVITAMIN ORAL Take 1 tablet by mouth every morning. Every day      omeprazole (PRILOSEC) 40 MG capsule Take 1 capsule (40 mg total) by mouth 2 (two) times daily before meals. 30 capsule 1    ranitidine (ZANTAC) 300 MG tablet Take 1 tablet (300 mg total) by mouth 2 (two) times daily. 60 tablet 11    simvastatin (ZOCOR) 10 MG tablet Take 1 tablet (10 mg total) by mouth every evening. 90 tablet 3     No current facility-administered medications on file prior to visit.      Social History     Socioeconomic History    Marital status:      Spouse name: Not on file    Number of children: Not on file    Years of education: Not on file    Highest education level: Not on file   Occupational History    Not on file   Social Needs    Financial resource strain: Not on file    Food insecurity:     Worry: Not on file     Inability: Not on file    Transportation needs:     Medical: Not on file     Non-medical: Not on file   Tobacco Use    Smoking status: Never Smoker    Smokeless tobacco: Never Used   Substance and Sexual Activity    Alcohol use: No     Alcohol/week: 0.0 standard drinks    Drug use: No    Sexual activity: Yes     Partners: Male   Lifestyle    Physical activity:     Days per week: Not on file     Minutes per session: Not on file    Stress: Not on file   Relationships    Social connections:     Talks on phone: Not on file     Gets together: Not on file     Attends Denominational service: Not on file     Active member of club or organization: Not on file     Attends meetings of clubs or organizations: Not on file     Relationship status: Not on file   Other Topics Concern    Not on file   Social History  Narrative    Not on file     Family History   Problem Relation Age of Onset    Heart disease Mother     Hypertension Father     Diabetes Father     Colon polyps Father     Colon cancer Neg Hx     Stomach cancer Neg Hx     Crohn's disease Neg Hx     Ulcerative colitis Neg Hx     Celiac disease Neg Hx          ROS:GENERAL: No fever, chills, fatigability or weight loss.  SKIN: No rashes, itching or changes in color or texture of skin.  HEAD: No headaches or recent head trauma.EYES: Visual acuity fine. No photophobia, ocular pain or diplopia.EARS: Denies ear pain, discharge or vertigo.NOSE: No loss of smell, no epistaxis or postnasal drip.MOUTH & THROAT: No hoarseness or change in voice. No excessive gum bleeding.NODES: Denies swollen glands.  CHEST: Denies GARCIA, cyanosis, wheezing, cough and sputum production.  CARDIOVASCULAR: Denies chest pain, PND, orthopnea or reduced exercise tolerance.  ABDOMEN: Appetite fine. No weight loss. Denies diarrhea, abdominal pain, hematemesis or blood in stool.  URINARY: No flank pain, dysuria or hematuria.  PERIPHERAL VASCULAR: No claudication or cyanosis.  MUSCULOSKELETAL: See above.  NEUROLOGIC: No history of seizures, paralysis, alteration of gait or coordination.  PE:    HEAD: Normocephalic, atraumatic.EYES: PERRL. EOMI.   EARS: TM's intact. Light reflex normal. No retraction or perforation.   NOSE: Mucosa pink. Airway clear.MOUTH & THROAT: No tonsillar enlargement. No pharyngeal erythema or exudate. No stridor.  NODES: No cervical, axillary or inguinal lymph node enlargement.  CHEST: Lungs clear to auscultation.  CARDIOVASCULAR: Normal S1, S2. No rubs, murmurs or gallops.  ABDOMEN: Bowel sounds normal. Not distended. Soft. No tenderness or masses.  MUSCULOSKELETAL: No palpable abnormality  NEUROLOGIC: Cranial Nerves: II-XII grossly intact.  Motor: 5/5 strength major flexors/extensors.  DTR's: Knees, Ankles 2+ and equal bilaterally; downgoing toes.  Sensory: Intact to  light touch distally.  Gait & Posture: Normal gait and fine motion. No cerebellar signs.   Protective Sensation (w/ 10 gram monofilament): Intact  Visual Inspection (Evidence of Foot Deformity, Ulceration, Nails Intact, Skin Intact):Normal  Pedal Pulses: Present    Impression: DM  HBP w OH   HLP  Asthma  Anxiety   Plan:Lab eval rev  Rec diet and ex recs  Reduce metformin 1000 bid to 1000 am 500 pm  Reduce benaz 10 to 5 qd

## 2020-01-07 RX ORDER — ALPRAZOLAM 0.25 MG/1
TABLET ORAL
Qty: 120 TABLET | Refills: 0 | Status: SHIPPED | OUTPATIENT
Start: 2020-01-07 | End: 2020-07-06

## 2020-02-12 ENCOUNTER — LAB VISIT (OUTPATIENT)
Dept: LAB | Facility: HOSPITAL | Age: 75
End: 2020-02-12
Attending: FAMILY MEDICINE
Payer: MEDICARE

## 2020-02-12 DIAGNOSIS — E11.9 TYPE 2 DIABETES MELLITUS WITHOUT COMPLICATION, UNSPECIFIED WHETHER LONG TERM INSULIN USE: ICD-10-CM

## 2020-02-12 LAB
ALBUMIN SERPL BCP-MCNC: 3.4 G/DL (ref 3.5–5.2)
ALP SERPL-CCNC: 75 U/L (ref 55–135)
ALT SERPL W/O P-5'-P-CCNC: 8 U/L (ref 10–44)
ANION GAP SERPL CALC-SCNC: 10 MMOL/L (ref 8–16)
AST SERPL-CCNC: 17 U/L (ref 10–40)
BILIRUB SERPL-MCNC: 0.5 MG/DL (ref 0.1–1)
BUN SERPL-MCNC: 16 MG/DL (ref 8–23)
CALCIUM SERPL-MCNC: 9.2 MG/DL (ref 8.7–10.5)
CHLORIDE SERPL-SCNC: 102 MMOL/L (ref 95–110)
CHOLEST SERPL-MCNC: 173 MG/DL (ref 120–199)
CHOLEST/HDLC SERPL: 3.3 {RATIO} (ref 2–5)
CO2 SERPL-SCNC: 26 MMOL/L (ref 23–29)
CREAT SERPL-MCNC: 0.9 MG/DL (ref 0.5–1.4)
EST. GFR  (AFRICAN AMERICAN): >60 ML/MIN/1.73 M^2
EST. GFR  (NON AFRICAN AMERICAN): >60 ML/MIN/1.73 M^2
GLUCOSE SERPL-MCNC: 95 MG/DL (ref 70–110)
HDLC SERPL-MCNC: 53 MG/DL (ref 40–75)
HDLC SERPL: 30.6 % (ref 20–50)
LDLC SERPL CALC-MCNC: 102.8 MG/DL (ref 63–159)
NONHDLC SERPL-MCNC: 120 MG/DL
POTASSIUM SERPL-SCNC: 3.8 MMOL/L (ref 3.5–5.1)
PROT SERPL-MCNC: 7.2 G/DL (ref 6–8.4)
SODIUM SERPL-SCNC: 138 MMOL/L (ref 136–145)
TRIGL SERPL-MCNC: 86 MG/DL (ref 30–150)

## 2020-02-12 PROCEDURE — 80053 COMPREHEN METABOLIC PANEL: CPT | Mod: HCNC

## 2020-02-12 PROCEDURE — 36415 COLL VENOUS BLD VENIPUNCTURE: CPT | Mod: HCNC,PO

## 2020-02-12 PROCEDURE — 83036 HEMOGLOBIN GLYCOSYLATED A1C: CPT | Mod: HCNC

## 2020-02-12 PROCEDURE — 80061 LIPID PANEL: CPT | Mod: HCNC

## 2020-02-13 LAB
ESTIMATED AVG GLUCOSE: 131 MG/DL (ref 68–131)
ESTIMATED AVG GLUCOSE: 131 MG/DL (ref 68–131)
HBA1C MFR BLD HPLC: 6.2 % (ref 4–5.6)
HBA1C MFR BLD HPLC: 6.2 % (ref 4–5.6)

## 2020-07-13 ENCOUNTER — OFFICE VISIT (OUTPATIENT)
Dept: FAMILY MEDICINE | Facility: CLINIC | Age: 75
End: 2020-07-13
Payer: MEDICARE

## 2020-07-13 VITALS
DIASTOLIC BLOOD PRESSURE: 73 MMHG | HEART RATE: 60 BPM | BODY MASS INDEX: 26.49 KG/M2 | HEIGHT: 65 IN | TEMPERATURE: 99 F | SYSTOLIC BLOOD PRESSURE: 139 MMHG | WEIGHT: 159 LBS

## 2020-07-13 DIAGNOSIS — G47.00 INSOMNIA, UNSPECIFIED TYPE: Primary | ICD-10-CM

## 2020-07-13 DIAGNOSIS — R41.3 MEMORY IMPAIRMENT: ICD-10-CM

## 2020-07-13 PROCEDURE — 1159F PR MEDICATION LIST DOCUMENTED IN MEDICAL RECORD: ICD-10-PCS | Mod: HCNC,S$GLB,, | Performed by: NURSE PRACTITIONER

## 2020-07-13 PROCEDURE — 1101F PR PT FALLS ASSESS DOC 0-1 FALLS W/OUT INJ PAST YR: ICD-10-PCS | Mod: HCNC,CPTII,S$GLB, | Performed by: NURSE PRACTITIONER

## 2020-07-13 PROCEDURE — 3075F SYST BP GE 130 - 139MM HG: CPT | Mod: HCNC,CPTII,S$GLB, | Performed by: NURSE PRACTITIONER

## 2020-07-13 PROCEDURE — 99213 OFFICE O/P EST LOW 20 MIN: CPT | Mod: HCNC,S$GLB,, | Performed by: NURSE PRACTITIONER

## 2020-07-13 PROCEDURE — 99999 PR PBB SHADOW E&M-EST. PATIENT-LVL IV: CPT | Mod: PBBFAC,HCNC,, | Performed by: NURSE PRACTITIONER

## 2020-07-13 PROCEDURE — 1159F MED LIST DOCD IN RCRD: CPT | Mod: HCNC,S$GLB,, | Performed by: NURSE PRACTITIONER

## 2020-07-13 PROCEDURE — 99999 PR PBB SHADOW E&M-EST. PATIENT-LVL IV: ICD-10-PCS | Mod: PBBFAC,HCNC,, | Performed by: NURSE PRACTITIONER

## 2020-07-13 PROCEDURE — 1125F PR PAIN SEVERITY QUANTIFIED, PAIN PRESENT: ICD-10-PCS | Mod: HCNC,S$GLB,, | Performed by: NURSE PRACTITIONER

## 2020-07-13 PROCEDURE — 1125F AMNT PAIN NOTED PAIN PRSNT: CPT | Mod: HCNC,S$GLB,, | Performed by: NURSE PRACTITIONER

## 2020-07-13 PROCEDURE — 3078F DIAST BP <80 MM HG: CPT | Mod: HCNC,CPTII,S$GLB, | Performed by: NURSE PRACTITIONER

## 2020-07-13 PROCEDURE — 3078F PR MOST RECENT DIASTOLIC BLOOD PRESSURE < 80 MM HG: ICD-10-PCS | Mod: HCNC,CPTII,S$GLB, | Performed by: NURSE PRACTITIONER

## 2020-07-13 PROCEDURE — 1101F PT FALLS ASSESS-DOCD LE1/YR: CPT | Mod: HCNC,CPTII,S$GLB, | Performed by: NURSE PRACTITIONER

## 2020-07-13 PROCEDURE — 99213 PR OFFICE/OUTPT VISIT, EST, LEVL III, 20-29 MIN: ICD-10-PCS | Mod: HCNC,S$GLB,, | Performed by: NURSE PRACTITIONER

## 2020-07-13 PROCEDURE — 3075F PR MOST RECENT SYSTOLIC BLOOD PRESS GE 130-139MM HG: ICD-10-PCS | Mod: HCNC,CPTII,S$GLB, | Performed by: NURSE PRACTITIONER

## 2020-07-13 RX ORDER — MONTELUKAST SODIUM 10 MG/1
10 TABLET ORAL DAILY
COMMUNITY
Start: 2020-06-12

## 2020-07-13 RX ORDER — ALBUTEROL SULFATE 90 UG/1
AEROSOL, METERED RESPIRATORY (INHALATION)
Qty: 18 G | Refills: 0 | Status: SHIPPED | OUTPATIENT
Start: 2020-07-13

## 2020-07-15 NOTE — PROGRESS NOTES
Subjective:       Patient ID: Cheryl Quiroga is a 75 y.o. female.    Chief Complaint: Insomnia  Pt in today for insomnia. Pt states has been experiencing since her  passed in March. She was prescribed xanax by her previous PCP but was unaware that she had this medication to take. Pt also has memory impairment; currently taking Aricept; had been seeing neurology for this but states that her neurologist retired; request to establish with new specialist. Pt has no other complaints today.  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    Migraines     Sciatica, left side 3/22/2017     Social History     Socioeconomic History    Marital status:      Spouse name: Not on file    Number of children: Not on file    Years of education: Not on file    Highest education level: Not on file   Occupational History    Not on file   Social Needs    Financial resource strain: Not on file    Food insecurity     Worry: Not on file     Inability: Not on file    Transportation needs     Medical: Not on file     Non-medical: Not on file   Tobacco Use    Smoking status: Never Smoker    Smokeless tobacco: Never Used   Substance and Sexual Activity    Alcohol use: No     Alcohol/week: 0.0 standard drinks    Drug use: No    Sexual activity: Yes     Partners: Male   Lifestyle    Physical activity     Days per week: Not on file     Minutes per session: Not on file    Stress: Not on file   Relationships    Social connections     Talks on phone: Not on file     Gets  together: Not on file     Attends Mosque service: Not on file     Active member of club or organization: Not on file     Attends meetings of clubs or organizations: Not on file     Relationship status: Not on file   Other Topics Concern    Not on file   Social History Narrative    Not on file     Past Surgical History:   Procedure Laterality Date    COLONOSCOPY  2010    COLONOSCOPY  08/17/2015    Dr. Pimentel, repeat in 5 years    HYSTERECTOMY      ovaries removed    JOINT REPLACEMENT      right knee    KNEE SURGERY Bilateral 08/2017    OOPHORECTOMY      UPPER GASTROINTESTINAL ENDOSCOPY  08/17/2015    Dr. Pimentel       HPI  Review of Systems   Constitutional: Negative.    HENT: Negative.    Eyes: Negative.    Respiratory: Negative.    Cardiovascular: Negative.    Gastrointestinal: Negative.    Endocrine: Negative.    Genitourinary: Negative.    Musculoskeletal: Negative.    Allergic/Immunologic: Negative.    Neurological: Positive for memory loss.   Psychiatric/Behavioral: Positive for sleep disturbance. The patient is nervous/anxious.          Objective:      Physical Exam  Vitals signs and nursing note reviewed.   Constitutional:       Appearance: Normal appearance.   HENT:      Head: Normocephalic.      Right Ear: Tympanic membrane, ear canal and external ear normal.      Left Ear: Tympanic membrane, ear canal and external ear normal.      Nose: Nose normal.      Mouth/Throat:      Mouth: Mucous membranes are moist.      Pharynx: Oropharynx is clear.   Eyes:      Pupils: Pupils are equal, round, and reactive to light.   Neck:      Musculoskeletal: Normal range of motion and neck supple.   Cardiovascular:      Rate and Rhythm: Normal rate and regular rhythm.      Pulses: Normal pulses.      Heart sounds: Normal heart sounds.   Pulmonary:      Effort: Pulmonary effort is normal.      Breath sounds: Normal breath sounds.   Abdominal:      General: Bowel sounds are normal.      Palpations: Abdomen is soft.    Musculoskeletal: Normal range of motion.   Skin:     General: Skin is warm and dry.      Capillary Refill: Capillary refill takes 2 to 3 seconds.   Neurological:      Mental Status: She is alert and oriented to person, place, and time.   Psychiatric:         Mood and Affect: Mood normal.         Behavior: Behavior normal.         Thought Content: Thought content normal.         Judgment: Judgment normal.         Assessment:       1. Insomnia, unspecified type    2. Memory impairment      Plan:       Cheryl was seen today for insomnia.    Diagnoses and all orders for this visit:    Insomnia, unspecified type  Xanax as prescribed    Memory impairment  -     Ambulatory referral/consult to Neurology; Future

## 2020-09-09 ENCOUNTER — PATIENT OUTREACH (OUTPATIENT)
Dept: ADMINISTRATIVE | Facility: OTHER | Age: 75
End: 2020-09-09

## 2020-09-09 DIAGNOSIS — E11.9 TYPE 2 DIABETES MELLITUS WITHOUT COMPLICATION, UNSPECIFIED WHETHER LONG TERM INSULIN USE: ICD-10-CM

## 2020-09-09 DIAGNOSIS — Z12.31 ENCOUNTER FOR SCREENING MAMMOGRAM FOR MALIGNANT NEOPLASM OF BREAST: Primary | ICD-10-CM

## 2020-09-09 NOTE — PROGRESS NOTES
Health Maintenance Due   Topic Date Due    Hepatitis C Screening  1945    Shingles Vaccine (2 of 3) 01/29/2013    Eye Exam  07/08/2017    DEXA SCAN  08/01/2019    TETANUS VACCINE  01/28/2020    Foot Exam  07/23/2020    Influenza Vaccine (1) 08/01/2020    Hemoglobin A1c  08/12/2020    Colonoscopy  09/04/2020     Updates were requested from care everywhere.  Chart was reviewed for overdue Proactive [facility] Encounters ([facility]) topics (CRS, Breast Cancer Screening, Eye exam)  Health Maintenance has been updated.  LINKS immunization registry triggered.  Immunizations were reconciled.  Order for HgA1c placed.

## 2021-07-29 ENCOUNTER — PATIENT OUTREACH (OUTPATIENT)
Dept: ADMINISTRATIVE | Facility: HOSPITAL | Age: 76
End: 2021-07-29

## 2023-06-01 NOTE — TELEPHONE ENCOUNTER
----- Message from Mila Ervin sent at 5/18/2017 10:59 AM CDT -----  Contact:  [facility]  Caller request call from nurse to verify RX for pt diabetic supplies, please contact caller at [phone]...    
Lm for pt to verify if she requested this  
Home